# Patient Record
Sex: FEMALE | Race: WHITE | NOT HISPANIC OR LATINO | Employment: UNEMPLOYED | URBAN - METROPOLITAN AREA
[De-identification: names, ages, dates, MRNs, and addresses within clinical notes are randomized per-mention and may not be internally consistent; named-entity substitution may affect disease eponyms.]

---

## 2017-02-06 ENCOUNTER — HOSPITAL ENCOUNTER (OUTPATIENT)
Dept: RADIOLOGY | Facility: MEDICAL CENTER | Age: 57
DRG: 460 | End: 2017-02-06
Attending: NEUROLOGICAL SURGERY | Admitting: NEUROLOGICAL SURGERY
Payer: COMMERCIAL

## 2017-02-06 DIAGNOSIS — Z01.812 PRE-OPERATIVE LABORATORY EXAMINATION: ICD-10-CM

## 2017-02-06 DIAGNOSIS — Z01.811 PRE-OPERATIVE RESPIRATORY EXAMINATION: ICD-10-CM

## 2017-02-06 DIAGNOSIS — Z01.810 PRE-OPERATIVE CARDIOVASCULAR EXAMINATION: ICD-10-CM

## 2017-02-06 LAB
ANION GAP SERPL CALC-SCNC: 7 MMOL/L (ref 0–11.9)
APPEARANCE UR: CLEAR
APTT PPP: 29.4 SEC (ref 24.7–36)
BASOPHILS # BLD AUTO: 0.7 % (ref 0–1.8)
BASOPHILS # BLD: 0.03 K/UL (ref 0–0.12)
BILIRUB UR QL STRIP.AUTO: NEGATIVE
BUN SERPL-MCNC: 14 MG/DL (ref 8–22)
CALCIUM SERPL-MCNC: 9.3 MG/DL (ref 8.5–10.5)
CHLORIDE SERPL-SCNC: 105 MMOL/L (ref 96–112)
CO2 SERPL-SCNC: 30 MMOL/L (ref 20–33)
COLOR UR: COLORLESS
CREAT SERPL-MCNC: 0.86 MG/DL (ref 0.5–1.4)
CULTURE IF INDICATED INDCX: NO UA CULTURE
EKG IMPRESSION: NORMAL
EOSINOPHIL # BLD AUTO: 0.06 K/UL (ref 0–0.51)
EOSINOPHIL NFR BLD: 1.4 % (ref 0–6.9)
ERYTHROCYTE [DISTWIDTH] IN BLOOD BY AUTOMATED COUNT: 45 FL (ref 35.9–50)
GFR SERPL CREATININE-BSD FRML MDRD: >60 ML/MIN/1.73 M 2
GLUCOSE SERPL-MCNC: 73 MG/DL (ref 65–99)
GLUCOSE UR STRIP.AUTO-MCNC: NEGATIVE MG/DL
HCT VFR BLD AUTO: 40 % (ref 37–47)
HGB BLD-MCNC: 13.4 G/DL (ref 12–16)
IMM GRANULOCYTES # BLD AUTO: 0.01 K/UL (ref 0–0.11)
IMM GRANULOCYTES NFR BLD AUTO: 0.2 % (ref 0–0.9)
INR PPP: 0.93 (ref 0.87–1.13)
KETONES UR STRIP.AUTO-MCNC: NEGATIVE MG/DL
LEUKOCYTE ESTERASE UR QL STRIP.AUTO: NEGATIVE
LYMPHOCYTES # BLD AUTO: 1.63 K/UL (ref 1–4.8)
LYMPHOCYTES NFR BLD: 38.3 % (ref 22–41)
MCH RBC QN AUTO: 31.8 PG (ref 27–33)
MCHC RBC AUTO-ENTMCNC: 33.5 G/DL (ref 33.6–35)
MCV RBC AUTO: 94.8 FL (ref 81.4–97.8)
MICRO URNS: NORMAL
MONOCYTES # BLD AUTO: 0.25 K/UL (ref 0–0.85)
MONOCYTES NFR BLD AUTO: 5.9 % (ref 0–13.4)
NEUTROPHILS # BLD AUTO: 2.28 K/UL (ref 2–7.15)
NEUTROPHILS NFR BLD: 53.5 % (ref 44–72)
NITRITE UR QL STRIP.AUTO: NEGATIVE
NRBC # BLD AUTO: 0 K/UL
NRBC BLD AUTO-RTO: 0 /100 WBC
PH UR STRIP.AUTO: 7 [PH]
PLATELET # BLD AUTO: 169 K/UL (ref 164–446)
PMV BLD AUTO: 9.7 FL (ref 9–12.9)
POTASSIUM SERPL-SCNC: 3.8 MMOL/L (ref 3.6–5.5)
PROT UR QL STRIP: NEGATIVE MG/DL
PROTHROMBIN TIME: 12.8 SEC (ref 12–14.6)
RBC # BLD AUTO: 4.22 M/UL (ref 4.2–5.4)
RBC UR QL AUTO: NEGATIVE
SODIUM SERPL-SCNC: 142 MMOL/L (ref 135–145)
SP GR UR STRIP.AUTO: 1.01
WBC # BLD AUTO: 4.3 K/UL (ref 4.8–10.8)

## 2017-02-06 PROCEDURE — 36415 COLL VENOUS BLD VENIPUNCTURE: CPT

## 2017-02-06 PROCEDURE — 85025 COMPLETE CBC W/AUTO DIFF WBC: CPT

## 2017-02-06 PROCEDURE — 85610 PROTHROMBIN TIME: CPT

## 2017-02-06 PROCEDURE — 71010 DX-CHEST-LIMITED (1 VIEW): CPT

## 2017-02-06 PROCEDURE — 85730 THROMBOPLASTIN TIME PARTIAL: CPT

## 2017-02-06 PROCEDURE — 81003 URINALYSIS AUTO W/O SCOPE: CPT

## 2017-02-06 PROCEDURE — 80048 BASIC METABOLIC PNL TOTAL CA: CPT

## 2017-02-06 RX ORDER — HYDROCODONE BITARTRATE AND ACETAMINOPHEN 10; 325 MG/1; MG/1
1-2 TABLET ORAL EVERY 6 HOURS PRN
Status: ON HOLD | COMMUNITY
End: 2017-02-22

## 2017-02-20 ENCOUNTER — APPOINTMENT (OUTPATIENT)
Dept: RADIOLOGY | Facility: MEDICAL CENTER | Age: 57
DRG: 460 | End: 2017-02-20
Attending: NEUROLOGICAL SURGERY
Payer: COMMERCIAL

## 2017-02-20 ENCOUNTER — HOSPITAL ENCOUNTER (INPATIENT)
Facility: MEDICAL CENTER | Age: 57
LOS: 2 days | DRG: 460 | End: 2017-02-22
Attending: NEUROLOGICAL SURGERY | Admitting: NEUROLOGICAL SURGERY
Payer: COMMERCIAL

## 2017-02-20 PROBLEM — M54.16 LUMBAR RADICULOPATHY: Status: ACTIVE | Noted: 2017-02-20

## 2017-02-20 PROCEDURE — 160048 HCHG OR STATISTICAL LEVEL 1-5: Performed by: NEUROLOGICAL SURGERY

## 2017-02-20 PROCEDURE — 500367 HCHG DRAIN KIT, HEMOVAC: Performed by: NEUROLOGICAL SURGERY

## 2017-02-20 PROCEDURE — 160002 HCHG RECOVERY MINUTES (STAT): Performed by: NEUROLOGICAL SURGERY

## 2017-02-20 PROCEDURE — C1763 CONN TISS, NON-HUMAN: HCPCS | Performed by: NEUROLOGICAL SURGERY

## 2017-02-20 PROCEDURE — A9270 NON-COVERED ITEM OR SERVICE: HCPCS | Performed by: NURSE PRACTITIONER

## 2017-02-20 PROCEDURE — 110382 HCHG SHELL REV 271: Performed by: NEUROLOGICAL SURGERY

## 2017-02-20 PROCEDURE — 700102 HCHG RX REV CODE 250 W/ 637 OVERRIDE(OP)

## 2017-02-20 PROCEDURE — 0QP004Z REMOVAL OF INTERNAL FIXATION DEVICE FROM LUMBAR VERTEBRA, OPEN APPROACH: ICD-10-PCS | Performed by: NEUROLOGICAL SURGERY

## 2017-02-20 PROCEDURE — 501838 HCHG SUTURE GENERAL: Performed by: NEUROLOGICAL SURGERY

## 2017-02-20 PROCEDURE — 160041 HCHG SURGERY MINUTES - EA ADDL 1 MIN LEVEL 4: Performed by: NEUROLOGICAL SURGERY

## 2017-02-20 PROCEDURE — 160009 HCHG ANES TIME/MIN: Performed by: NEUROLOGICAL SURGERY

## 2017-02-20 PROCEDURE — A9270 NON-COVERED ITEM OR SERVICE: HCPCS

## 2017-02-20 PROCEDURE — 700112 HCHG RX REV CODE 229: Performed by: NURSE PRACTITIONER

## 2017-02-20 PROCEDURE — 160035 HCHG PACU - 1ST 60 MINS PHASE I: Performed by: NEUROLOGICAL SURGERY

## 2017-02-20 PROCEDURE — 160029 HCHG SURGERY MINUTES - 1ST 30 MINS LEVEL 4: Performed by: NEUROLOGICAL SURGERY

## 2017-02-20 PROCEDURE — 700101 HCHG RX REV CODE 250

## 2017-02-20 PROCEDURE — 700111 HCHG RX REV CODE 636 W/ 250 OVERRIDE (IP)

## 2017-02-20 PROCEDURE — 110371 HCHG SHELL REV 272: Performed by: NEUROLOGICAL SURGERY

## 2017-02-20 PROCEDURE — A4606 OXYGEN PROBE USED W OXIMETER: HCPCS | Performed by: NEUROLOGICAL SURGERY

## 2017-02-20 PROCEDURE — 0SG30K1 FUSION OF LUMBOSACRAL JOINT WITH NONAUTOLOGOUS TISSUE SUBSTITUTE, POSTERIOR APPROACH, POSTERIOR COLUMN, OPEN APPROACH: ICD-10-PCS | Performed by: NEUROLOGICAL SURGERY

## 2017-02-20 PROCEDURE — 700102 HCHG RX REV CODE 250 W/ 637 OVERRIDE(OP): Performed by: NURSE PRACTITIONER

## 2017-02-20 PROCEDURE — 700101 HCHG RX REV CODE 250: Performed by: NURSE PRACTITIONER

## 2017-02-20 PROCEDURE — 770006 HCHG ROOM/CARE - MED/SURG/GYN SEMI*

## 2017-02-20 PROCEDURE — 502626 HCHG SURGIFLO HEMOSTATIC MATRIX 6ML: Performed by: NEUROLOGICAL SURGERY

## 2017-02-20 PROCEDURE — 502240 HCHG MISC OR SUPPLY RC 0272: Performed by: NEUROLOGICAL SURGERY

## 2017-02-20 PROCEDURE — 0SG00K1 FUSION OF LUMBAR VERTEBRAL JOINT WITH NONAUTOLOGOUS TISSUE SUBSTITUTE, POSTERIOR APPROACH, POSTERIOR COLUMN, OPEN APPROACH: ICD-10-PCS | Performed by: NEUROLOGICAL SURGERY

## 2017-02-20 PROCEDURE — 01NB0ZZ RELEASE LUMBAR NERVE, OPEN APPROACH: ICD-10-PCS | Performed by: NEUROLOGICAL SURGERY

## 2017-02-20 PROCEDURE — 160036 HCHG PACU - EA ADDL 30 MINS PHASE I: Performed by: NEUROLOGICAL SURGERY

## 2017-02-20 PROCEDURE — A4314 CATH W/DRAINAGE 2-WAY LATEX: HCPCS | Performed by: NEUROLOGICAL SURGERY

## 2017-02-20 DEVICE — MAGNIFUSE 1X5CM: Type: IMPLANTABLE DEVICE | Status: FUNCTIONAL

## 2017-02-20 RX ORDER — BUPIVACAINE HYDROCHLORIDE AND EPINEPHRINE 5; 5 MG/ML; UG/ML
INJECTION, SOLUTION EPIDURAL; INTRACAUDAL; PERINEURAL
Status: DISCONTINUED | OUTPATIENT
Start: 2017-02-20 | End: 2017-02-20 | Stop reason: HOSPADM

## 2017-02-20 RX ORDER — MAGNESIUM HYDROXIDE 1200 MG/15ML
LIQUID ORAL
Status: DISCONTINUED | OUTPATIENT
Start: 2017-02-20 | End: 2017-02-20 | Stop reason: HOSPADM

## 2017-02-20 RX ORDER — ENEMA 19; 7 G/133ML; G/133ML
1 ENEMA RECTAL
Status: DISCONTINUED | OUTPATIENT
Start: 2017-02-20 | End: 2017-02-22 | Stop reason: HOSPADM

## 2017-02-20 RX ORDER — AMOXICILLIN 250 MG
1 CAPSULE ORAL
Status: DISCONTINUED | OUTPATIENT
Start: 2017-02-20 | End: 2017-02-22 | Stop reason: HOSPADM

## 2017-02-20 RX ORDER — ONDANSETRON 4 MG/1
4 TABLET, ORALLY DISINTEGRATING ORAL EVERY 4 HOURS PRN
Status: DISCONTINUED | OUTPATIENT
Start: 2017-02-20 | End: 2017-02-22 | Stop reason: HOSPADM

## 2017-02-20 RX ORDER — HYDROCHLOROTHIAZIDE 25 MG/1
25 TABLET ORAL 2 TIMES DAILY PRN
Status: DISCONTINUED | OUTPATIENT
Start: 2017-02-20 | End: 2017-02-22 | Stop reason: HOSPADM

## 2017-02-20 RX ORDER — DOCUSATE SODIUM 100 MG/1
100 CAPSULE, LIQUID FILLED ORAL 2 TIMES DAILY
Status: DISCONTINUED | OUTPATIENT
Start: 2017-02-20 | End: 2017-02-22 | Stop reason: HOSPADM

## 2017-02-20 RX ORDER — METHOCARBAMOL 750 MG/1
750 TABLET, FILM COATED ORAL EVERY 8 HOURS PRN
Status: DISCONTINUED | OUTPATIENT
Start: 2017-02-20 | End: 2017-02-22 | Stop reason: HOSPADM

## 2017-02-20 RX ORDER — MIDAZOLAM HYDROCHLORIDE 1 MG/ML
INJECTION INTRAMUSCULAR; INTRAVENOUS
Status: DISPENSED
Start: 2017-02-20 | End: 2017-02-21

## 2017-02-20 RX ORDER — GABAPENTIN 300 MG/1
600 CAPSULE ORAL 3 TIMES DAILY
Status: DISCONTINUED | OUTPATIENT
Start: 2017-02-20 | End: 2017-02-22 | Stop reason: HOSPADM

## 2017-02-20 RX ORDER — DIPHENHYDRAMINE HYDROCHLORIDE 50 MG/ML
25 INJECTION INTRAMUSCULAR; INTRAVENOUS EVERY 6 HOURS PRN
Status: DISCONTINUED | OUTPATIENT
Start: 2017-02-20 | End: 2017-02-22 | Stop reason: HOSPADM

## 2017-02-20 RX ORDER — PROMETHAZINE HYDROCHLORIDE 25 MG/1
12.5-25 SUPPOSITORY RECTAL EVERY 4 HOURS PRN
Status: DISCONTINUED | OUTPATIENT
Start: 2017-02-20 | End: 2017-02-22 | Stop reason: HOSPADM

## 2017-02-20 RX ORDER — CELECOXIB 200 MG/1
CAPSULE ORAL
Status: COMPLETED
Start: 2017-02-20 | End: 2017-02-20

## 2017-02-20 RX ORDER — DIPHENHYDRAMINE HYDROCHLORIDE 50 MG/ML
INJECTION INTRAMUSCULAR; INTRAVENOUS
Status: COMPLETED
Start: 2017-02-20 | End: 2017-02-20

## 2017-02-20 RX ORDER — OXYCODONE HCL 20 MG/1
TABLET, FILM COATED, EXTENDED RELEASE ORAL
Status: COMPLETED
Start: 2017-02-20 | End: 2017-02-20

## 2017-02-20 RX ORDER — ACETAMINOPHEN 500 MG
TABLET ORAL
Status: COMPLETED
Start: 2017-02-20 | End: 2017-02-20

## 2017-02-20 RX ORDER — SODIUM CHLORIDE AND POTASSIUM CHLORIDE 150; 900 MG/100ML; MG/100ML
INJECTION, SOLUTION INTRAVENOUS CONTINUOUS
Status: DISCONTINUED | OUTPATIENT
Start: 2017-02-20 | End: 2017-02-22 | Stop reason: HOSPADM

## 2017-02-20 RX ORDER — BISACODYL 10 MG
10 SUPPOSITORY, RECTAL RECTAL
Status: DISCONTINUED | OUTPATIENT
Start: 2017-02-20 | End: 2017-02-22 | Stop reason: HOSPADM

## 2017-02-20 RX ORDER — METOPROLOL SUCCINATE 25 MG/1
50 TABLET, EXTENDED RELEASE ORAL DAILY
Status: DISCONTINUED | OUTPATIENT
Start: 2017-02-21 | End: 2017-02-22 | Stop reason: HOSPADM

## 2017-02-20 RX ORDER — DIPHENHYDRAMINE HCL 25 MG
25 TABLET ORAL EVERY 6 HOURS PRN
Status: DISCONTINUED | OUTPATIENT
Start: 2017-02-20 | End: 2017-02-22 | Stop reason: HOSPADM

## 2017-02-20 RX ORDER — SODIUM CHLORIDE, SODIUM LACTATE, POTASSIUM CHLORIDE, CALCIUM CHLORIDE 600; 310; 30; 20 MG/100ML; MG/100ML; MG/100ML; MG/100ML
1000 INJECTION, SOLUTION INTRAVENOUS
Status: COMPLETED | OUTPATIENT
Start: 2017-02-20 | End: 2017-02-20

## 2017-02-20 RX ORDER — CEFAZOLIN SODIUM 2 G/100ML
2 INJECTION, SOLUTION INTRAVENOUS EVERY 8 HOURS
Status: COMPLETED | OUTPATIENT
Start: 2017-02-20 | End: 2017-02-21

## 2017-02-20 RX ORDER — ONDANSETRON 2 MG/ML
4 INJECTION INTRAMUSCULAR; INTRAVENOUS EVERY 4 HOURS PRN
Status: DISCONTINUED | OUTPATIENT
Start: 2017-02-20 | End: 2017-02-22 | Stop reason: HOSPADM

## 2017-02-20 RX ORDER — POLYETHYLENE GLYCOL 3350 17 G/17G
1 POWDER, FOR SOLUTION ORAL 2 TIMES DAILY PRN
Status: DISCONTINUED | OUTPATIENT
Start: 2017-02-20 | End: 2017-02-22 | Stop reason: HOSPADM

## 2017-02-20 RX ORDER — LIDOCAINE HYDROCHLORIDE 10 MG/ML
INJECTION, SOLUTION INFILTRATION; PERINEURAL
Status: COMPLETED
Start: 2017-02-20 | End: 2017-02-20

## 2017-02-20 RX ORDER — BACITRACIN 50000 [IU]/1
INJECTION, POWDER, FOR SOLUTION INTRAMUSCULAR
Status: DISCONTINUED | OUTPATIENT
Start: 2017-02-20 | End: 2017-02-20 | Stop reason: HOSPADM

## 2017-02-20 RX ORDER — PROMETHAZINE HYDROCHLORIDE 25 MG/1
12.5-25 TABLET ORAL EVERY 4 HOURS PRN
Status: DISCONTINUED | OUTPATIENT
Start: 2017-02-20 | End: 2017-02-22 | Stop reason: HOSPADM

## 2017-02-20 RX ORDER — ONDANSETRON 2 MG/ML
INJECTION INTRAMUSCULAR; INTRAVENOUS
Status: COMPLETED
Start: 2017-02-20 | End: 2017-02-20

## 2017-02-20 RX ORDER — GABAPENTIN 300 MG/1
CAPSULE ORAL
Status: COMPLETED
Start: 2017-02-20 | End: 2017-02-20

## 2017-02-20 RX ADMIN — FENTANYL CITRATE 50 MCG: 50 INJECTION, SOLUTION INTRAMUSCULAR; INTRAVENOUS at 21:25

## 2017-02-20 RX ADMIN — CELECOXIB 400 MG: 200 CAPSULE ORAL at 14:45

## 2017-02-20 RX ADMIN — ONDANSETRON 4 MG: 2 INJECTION, SOLUTION INTRAMUSCULAR; INTRAVENOUS at 20:47

## 2017-02-20 RX ADMIN — ACETAMINOPHEN 1000 MG: 500 TABLET, FILM COATED ORAL at 14:45

## 2017-02-20 RX ADMIN — HYDROMORPHONE HYDROCHLORIDE: 2 INJECTION INTRAMUSCULAR; INTRAVENOUS; SUBCUTANEOUS at 22:15

## 2017-02-20 RX ADMIN — HYDROMORPHONE HYDROCHLORIDE 0.5 MG: 1 INJECTION, SOLUTION INTRAMUSCULAR; INTRAVENOUS; SUBCUTANEOUS at 20:55

## 2017-02-20 RX ADMIN — FENTANYL CITRATE 50 MCG: 50 INJECTION, SOLUTION INTRAMUSCULAR; INTRAVENOUS at 21:00

## 2017-02-20 RX ADMIN — DIPHENHYDRAMINE HYDROCHLORIDE 12.5 MG: 50 INJECTION INTRAMUSCULAR; INTRAVENOUS at 21:05

## 2017-02-20 RX ADMIN — ONDANSETRON 4 MG: 2 INJECTION, SOLUTION INTRAMUSCULAR; INTRAVENOUS at 21:17

## 2017-02-20 RX ADMIN — DIPHENHYDRAMINE HYDROCHLORIDE 12.5 MG: 50 INJECTION INTRAMUSCULAR; INTRAVENOUS at 21:00

## 2017-02-20 RX ADMIN — SODIUM CHLORIDE, SODIUM LACTATE, POTASSIUM CHLORIDE, CALCIUM CHLORIDE 1000 ML: 600; 310; 30; 20 INJECTION, SOLUTION INTRAVENOUS at 13:00

## 2017-02-20 RX ADMIN — LIDOCAINE HYDROCHLORIDE 0.3 ML: 10 INJECTION, SOLUTION INFILTRATION; PERINEURAL at 13:00

## 2017-02-20 RX ADMIN — POTASSIUM CHLORIDE AND SODIUM CHLORIDE: 900; 150 INJECTION, SOLUTION INTRAVENOUS at 22:39

## 2017-02-20 RX ADMIN — METHOCARBAMOL 750 MG: 750 TABLET ORAL at 23:03

## 2017-02-20 RX ADMIN — DOCUSATE SODIUM 100 MG: 100 CAPSULE ORAL at 22:51

## 2017-02-20 RX ADMIN — HYDROMORPHONE HYDROCHLORIDE 0.5 MG: 1 INJECTION, SOLUTION INTRAMUSCULAR; INTRAVENOUS; SUBCUTANEOUS at 20:50

## 2017-02-20 RX ADMIN — GABAPENTIN 600 MG: 300 CAPSULE ORAL at 14:45

## 2017-02-20 RX ADMIN — OXYCODONE HYDROCHLORIDE 20 MG: 20 TABLET, FILM COATED, EXTENDED RELEASE ORAL at 14:45

## 2017-02-20 RX ADMIN — GABAPENTIN 600 MG: 300 CAPSULE ORAL at 22:51

## 2017-02-20 ASSESSMENT — PAIN SCALES - GENERAL
PAINLEVEL_OUTOF10: 7
PAINLEVEL_OUTOF10: 4
PAINLEVEL_OUTOF10: 8
PAINLEVEL_OUTOF10: 5
PAINLEVEL_OUTOF10: 7
PAINLEVEL_OUTOF10: 7

## 2017-02-20 NOTE — IP AVS SNAPSHOT
" <p align=\"LEFT\"><IMG SRC=\"//EMRWB/blob$/[email or web address]\" alt=\"Image\" WIDTH=\"50%\" HEIGHT=\"200\" BORDER=\"\"></p>                   Name:Anabella Neves  Medical Record Number:[id number]  CSN: [id number]    YOB: 1960   Age: 56 y.o.  Sex: female  HT:1.676 m (5' 5.98\") WT: 80.5 kg (177 lb 7.5 oz)          Admit Date: 2/20/2017     Discharge Date:   Today's Date: 2/22/2017  Attending Doctor:  Shawn Carter M.D.                  Allergies:  Review of patient's allergies indicates no known allergies.          Follow-up Information     1. Follow up with Shawn Carter M.D. In 2 weeks.    Specialty:  Neurosurgery    Contact information    [address] [address]  [phone]           Medication List      Take these Medications        Instructions    gabapentin 600 MG tablet   Commonly known as:  NEURONTIN    Take 600 mg by mouth 3 times a day.   Dose:  600 mg       hydrochlorothiazide 25 MG Tabs   Commonly known as:  HYDRODIURIL    Take 25 mg by mouth 2 times a day as needed.   Dose:  25 mg       methocarbamol 750 MG Tabs   Commonly known as:  ROBAXIN    Take 1 Tab by mouth every 8 hours as needed (Use caution when administering with other sedatives (narcotics, muscle relaxants, anxiolytics, and hypnotics)).   Dose:  750 mg       metoprolol SR 50 MG Tb24   Commonly known as:  TOPROL XL    Take 50 mg by mouth every day.   Dose:  50 mg       oxycodone-acetaminophen  MG Tabs   Commonly known as:  PERCOCET-10    Take 1-2 Tabs by mouth every four hours as needed for Moderate Pain.   Dose:  1-2 Tab         "

## 2017-02-20 NOTE — IP AVS SNAPSHOT
2/22/2017          Anabella Trish Edinson  [address]  [facility] [address]    Dear Anabella:    [facility] wants to ensure your discharge home is safe and you or your loved ones have had all your questions answered regarding your care after you leave the hospital.    You may receive a telephone call within two days of your discharge.  This call is to make certain you understand your discharge instructions as well as ensure we provided you with the best care possible during your stay with us.     The call will only last approximately 3-5 minutes and will be done by a nurse.    Once again, we want to ensure your discharge home is safe and that you have a clear understanding of any next steps in your care.  If you have any questions or concerns, please do not hesitate to contact us, we are here for you.  Thank you for choosing [facility] for your healthcare needs.    Sincerely,    Anant Taylor    [facility]

## 2017-02-20 NOTE — IP AVS SNAPSHOT
Travergence Access Code: [email or web address]  Expires: 2/27/2017  9:44 AM    Your email address is not on file at [facility] [employer].  Email Addresses are required for you to sign up for Travergence, please contact [phone] to verify your personal information and to provide your email address prior to attempting to register for Travergence.    Anabella Neves  [address]  [place] [address]    Travergence  A secure, online tool to manage your health information     [employer]’s Travergence® is a secure, online tool that connects you to your personalized health information from the privacy of your home -- day or night - making it very easy for you to manage your healthcare. Once the activation process is completed, you can even access your medical information using the Travergence corbin, which is available for free in the Apple Corbin store or Google Play store.     To learn more about Travergence, visit [email or web address]    There are two levels of access available (as shown below):   My Chart Features  [facility] Primary Care Doctor [facility]  Specialists [facility]  Urgent  Care Non-[facility] Primary Care Doctor   Email your healthcare team securely and privately 24/7 X X X    Manage appointments: schedule your next appointment; view details of past/upcoming appointments X      Request prescription refills. X      View recent personal medical records, including lab and immunizations X X X X   View health record, including health history, allergies, medications X X X X   Read reports about your outpatient visits, procedures, consult and ER notes X X X X   See your discharge summary, which is a recap of your hospital and/or ER visit that includes your diagnosis, lab results, and care plan X X  X     How to register for Travergence:  Once your e-mail address has been verified, follow the following steps to sign up for Travergence.     1. Go to  [email or web address]  2. Click on the Sign Up Now box, which takes you to the New Member Sign Up page.  You will need to provide the following information:  a. Enter your [employer] Access Code exactly as it appears at the top of this page. (You will not need to use this code after you’ve completed the sign-up process. If you do not sign up before the expiration date, you must request a new code.)   b. Enter your date of birth.   c. Enter your home email address.   d. Click Submit, and follow the next screen’s instructions.  3. Create a [employer] ID. This will be your Dynamics login ID and cannot be changed, so think of one that is secure and easy to remember.  4. Create a Dynamics password. You can change your password at any time.  5. Enter your Password Reset Question and Answer. This can be used at a later time if you forget your password.   6. Enter your e-mail address. This allows you to receive e-mail notifications when new information is available in Dynamics.  7. Click Sign Up. You can now view your health information.    For assistance activating your Dynamics account, call [phone]

## 2017-02-20 NOTE — PROGRESS NOTES
Pt states that she is in pain in pre op- pt states that she last took her Gabapentin and [place] at 0900 not 1000 that she previously told the pharmacy tech earlier today-RN will call anesthesia

## 2017-02-20 NOTE — IP AVS SNAPSHOT
" Home Care Instructions                                                                                                                  Name:Anabella Neves  Medical Record Number:[id number]  CSN: [id number]    YOB: 1960   Age: 56 y.o.  Sex: female  HT:1.676 m (5' 5.98\") WT: 80.5 kg (177 lb 7.5 oz)          Admit Date: 2/20/2017     Discharge Date:   Today's Date: 2/22/2017  Attending Doctor:  Shawn Carter M.D.                  Allergies:  Review of patient's allergies indicates no known allergies.            Discharge Instructions       Discharge Instructions    Discharged to home by car with relative. Discharged via wheelchair, hospital escort: Yes.  Special equipment needed: Not Applicable    Be sure to schedule a follow-up appointment with your primary care doctor or any specialists as instructed.     Discharge Plan:   Diet Plan: Discussed  Activity Level: Discussed  Confirmed Follow up Appointment: Patient to Call and Schedule Appointment  Confirmed Symptoms Management: Discussed  Medication Reconciliation Updated: Yes  Influenza Vaccine Indication: Indicated: Adults > or equal to 18 years of age with severe allergy to eggs (Flublok vaccine)  Influenza Vaccine Given - only chart on this line when given: Influenza Vaccine Given (See MAR)    I understand that a diet low in cholesterol, fat, and sodium is recommended for good health. Unless I have been given specific instructions below for another diet, I accept this instruction as my diet prescription.   Other diet: none    Special Instructions:   Ambulate as much as comfortable  Ok to shower 2/23, pat incision dry, leave open to air- no dressing   No Aspirin or non-steroidal anti-inflammatory medications (aleve, motrin, ibuprofen, celebrex)  No driving for 2 weeks/ No driving while on narcotic medication  Over the counter stool softeners daily while on narcotics  No lifting greater than 15 pounds, no repetitive bending or twisting  Follow up at " [facility] 2 weeks after surgery    · Is patient discharged on Warfarin / Coumadin?   No     · Is patient Post Blood Transfusion?  No    Depression / Suicide Risk    As you are discharged from this [facility] facility, it is important to learn how to keep safe from harming yourself.    Recognize the warning signs:  · Abrupt changes in personality, positive or negative- including increase in energy   · Giving away possessions  · Change in eating patterns- significant weight changes-  positive or negative  · Change in sleeping patterns- unable to sleep or sleeping all the time   · Unwillingness or inability to communicate  · Depression  · Unusual sadness, discouragement and loneliness  · Talk of wanting to die  · Neglect of personal appearance   · Rebelliousness- reckless behavior  · Withdrawal from people/activities they love  · Confusion- inability to concentrate     If you or a loved one observes any of these behaviors or has concerns about self-harm, here's what you can do:  · Talk about it- your feelings and reasons for harming yourself  · Remove any means that you might use to hurt yourself (examples: pills, rope, extension cords, firearm)  · Get professional help from the community (Mental Health, Substance Abuse, psychological counseling)  · Do not be alone:Call your Safe Contact- someone whom you trust who will be there for you.  · Call your local CRISIS HOTLINE 525-6668 or 316-402-0437  · Call your local Children's Mobile Crisis Response Team [place] (372) 220-4632 or www.[employer]  · Call the toll free National Suicide Prevention Hotlines   · National Suicide Prevention Lifeline 586-536-XSCV (5038)  · National Hope Line Network 800-SUICIDE (233-8560)        Follow-up Information     1. Follow up with Shawn Carter M.D. In 2 weeks.    Specialty:  Neurosurgery    Contact information    [address]  [place] [address] [phone]           Discharge Medication  Instructions:    Below are the medications your physician expects you to take upon discharge:    Review all your home medications and newly ordered medications with your doctor and/or pharmacist. Follow medication instructions as directed by your doctor and/or pharmacist.    Please keep your medication list with you and share with your physician.               Medication List      START taking these medications        Instructions    oxycodone-acetaminophen  MG Tabs   Last time this was given:  2 Tabs on 2/22/2017 11:07 AM   Commonly known as:  PERCOCET-10    Take 1-2 Tabs by mouth every four hours as needed for Moderate Pain.   Dose:  1-2 Tab         CONTINUE taking these medications        Instructions    gabapentin 600 MG tablet   Commonly known as:  NEURONTIN    Take 600 mg by mouth 3 times a day.   Dose:  600 mg       hydrochlorothiazide 25 MG Tabs   Commonly known as:  HYDRODIURIL    Take 25 mg by mouth 2 times a day as needed.   Dose:  25 mg       methocarbamol 750 MG Tabs   Last time this was given:  750 mg on 2/20/2017 11:03 PM   Commonly known as:  ROBAXIN    Take 1 Tab by mouth every 8 hours as needed (Use caution when administering with other sedatives (narcotics, muscle relaxants, anxiolytics, and hypnotics)).   Dose:  750 mg       metoprolol SR 50 MG Tb24   Commonly known as:  TOPROL XL    Take 50 mg by mouth every day.   Dose:  50 mg         STOP taking these medications     hydrocodone/acetaminophen  MG Tabs   Commonly known as:  NORCO               Instructions           Diet / Nutrition:    Follow any diet instructions given to you by your doctor or the dietician, including how much salt (sodium) you are allowed each day.    If you are overweight, talk to your doctor about a weight reduction plan.    Activity:    Remain physically active following your doctor's instructions about exercise and activity.    Rest often.     Any time you become even a little tired or short of breath, SIT  DOWN and rest.    Worsening Symptoms:    Report any of the following signs and symptoms to the doctor's office immediately:    *Pain of jaw, arm, or neck  *Chest pain not relieved by medication                               *Dizziness or loss of consciousness  *Difficulty breathing even when at rest   *More tired than usual                                       *Bleeding drainage or swelling of surgical site  *Swelling of feet, ankles, legs or stomach                 *Fever (>100ºF)  *Pink or blood tinged sputum  *Weight gain (3lbs/day or 5lbs /week)           *Shock from internal defibrillator (if applicable)  *Palpitations or irregular heartbeats                *Cool and/or numb extremities    Stroke Awareness    Common Risk Factors for Stroke include:    Age  Atrial Fibrillation  Carotid Artery Stenosis  Diabetes Mellitus  Excessive alcohol consumption  High blood pressure  Overweight   Physical inactivity  Smoking    Warning signs and symptoms of a stroke include:    *Sudden numbness or weakness of the face, arm or leg (especially on one side of the body).  *Sudden confusion, trouble speaking or understanding.  *Sudden trouble seeing in one or both eyes.  *Sudden trouble walking, dizziness, loss of balance or coordination.Sudden severe headache with no known cause.    It is very important to get treatment quickly when a stroke occurs. If you experience any of the above warning signs, call 911 immediately.                   Disclaimer         Quit Smoking / Tobacco Use:    I understand the use of any tobacco products increases my chance of suffering from future heart disease or stroke and could cause other illnesses which may shorten my life. Quitting the use of tobacco products is the single most important thing I can do to improve my health. For further information on smoking / tobacco cessation call a Toll Free Quit Line at 1-596.856.7279 (*National Cancer [place]) or 1-519.673.8290 (American Lung  Association) or you can access the web based program at www.lungusa.org.    [place] Tobacco Users Help Line:  (705) 987-2344       Toll Free: 1-622.926.5036  Quit Tobacco Program [facility] (785)268-6693    Crisis Hotline:    [place] Crisis Hotline:  8-077-FAKTJHK or 1-502.854.8509    [place] Crisis Hotline:    1-350.369.4973 or 689-592-4306    Discharge Survey:   Thank you for choosing [facility]. We hope we did everything we could to make your hospital stay a pleasant one. You may be receiving a phone survey and we would appreciate your time and participation in answering the questions. Your input is very valuable to us in our efforts to improve our service to our patients and their families.        My signature on this form indicates that:    1. I have reviewed and understand the above information.  2. My questions regarding this information have been answered to my satisfaction.  3. I have formulated a plan with my discharge nurse to obtain my prescribed medications for home.                  Disclaimer         __________________________________                     __________       ________                       Patient Signature                                                 Date                    Time

## 2017-02-21 PROCEDURE — 97165 OT EVAL LOW COMPLEX 30 MIN: CPT

## 2017-02-21 PROCEDURE — 97161 PT EVAL LOW COMPLEX 20 MIN: CPT

## 2017-02-21 PROCEDURE — 700102 HCHG RX REV CODE 250 W/ 637 OVERRIDE(OP): Performed by: NURSE PRACTITIONER

## 2017-02-21 PROCEDURE — G8988 SELF CARE GOAL STATUS: HCPCS | Mod: CI

## 2017-02-21 PROCEDURE — G8980 MOBILITY D/C STATUS: HCPCS | Mod: CI

## 2017-02-21 PROCEDURE — 90471 IMMUNIZATION ADMIN: CPT

## 2017-02-21 PROCEDURE — G8978 MOBILITY CURRENT STATUS: HCPCS | Mod: CI

## 2017-02-21 PROCEDURE — G8989 SELF CARE D/C STATUS: HCPCS | Mod: CI

## 2017-02-21 PROCEDURE — A9270 NON-COVERED ITEM OR SERVICE: HCPCS | Performed by: NURSE PRACTITIONER

## 2017-02-21 PROCEDURE — 700102 HCHG RX REV CODE 250 W/ 637 OVERRIDE(OP): Performed by: NEUROLOGICAL SURGERY

## 2017-02-21 PROCEDURE — 90686 IIV4 VACC NO PRSV 0.5 ML IM: CPT | Performed by: NEUROLOGICAL SURGERY

## 2017-02-21 PROCEDURE — G8987 SELF CARE CURRENT STATUS: HCPCS | Mod: CI

## 2017-02-21 PROCEDURE — G8979 MOBILITY GOAL STATUS: HCPCS | Mod: CI

## 2017-02-21 PROCEDURE — 700112 HCHG RX REV CODE 229: Performed by: NURSE PRACTITIONER

## 2017-02-21 PROCEDURE — 700111 HCHG RX REV CODE 636 W/ 250 OVERRIDE (IP): Performed by: NURSE PRACTITIONER

## 2017-02-21 PROCEDURE — 700111 HCHG RX REV CODE 636 W/ 250 OVERRIDE (IP): Performed by: NEUROLOGICAL SURGERY

## 2017-02-21 PROCEDURE — 3E0234Z INTRODUCTION OF SERUM, TOXOID AND VACCINE INTO MUSCLE, PERCUTANEOUS APPROACH: ICD-10-PCS | Performed by: NEUROLOGICAL SURGERY

## 2017-02-21 PROCEDURE — A9270 NON-COVERED ITEM OR SERVICE: HCPCS | Performed by: NEUROLOGICAL SURGERY

## 2017-02-21 PROCEDURE — 770001 HCHG ROOM/CARE - MED/SURG/GYN PRIV*

## 2017-02-21 RX ORDER — OXYCODONE AND ACETAMINOPHEN 10; 325 MG/1; MG/1
1-2 TABLET ORAL EVERY 4 HOURS PRN
Status: DISCONTINUED | OUTPATIENT
Start: 2017-02-21 | End: 2017-02-22 | Stop reason: HOSPADM

## 2017-02-21 RX ADMIN — GABAPENTIN 600 MG: 300 CAPSULE ORAL at 20:06

## 2017-02-21 RX ADMIN — CEFAZOLIN SODIUM 2 G: 2 INJECTION, SOLUTION INTRAVENOUS at 09:04

## 2017-02-21 RX ADMIN — OXYCODONE HYDROCHLORIDE AND ACETAMINOPHEN 2 TABLET: 10; 325 TABLET ORAL at 22:21

## 2017-02-21 RX ADMIN — DOCUSATE SODIUM 100 MG: 100 CAPSULE ORAL at 09:05

## 2017-02-21 RX ADMIN — OXYCODONE HYDROCHLORIDE AND ACETAMINOPHEN 2 TABLET: 10; 325 TABLET ORAL at 18:23

## 2017-02-21 RX ADMIN — GABAPENTIN 600 MG: 300 CAPSULE ORAL at 16:11

## 2017-02-21 RX ADMIN — OXYCODONE HYDROCHLORIDE AND ACETAMINOPHEN 1 TABLET: 10; 325 TABLET ORAL at 09:05

## 2017-02-21 RX ADMIN — GABAPENTIN 600 MG: 300 CAPSULE ORAL at 09:04

## 2017-02-21 RX ADMIN — INFLUENZA A VIRUS A/CALIFORNIA/7/2009 X-179A (H1N1) ANTIGEN (FORMALDEHYDE INACTIVATED), INFLUENZA A VIRUS A/HONG KONG/4801/2014 X-263B (H3N2) ANTIGEN (FORMALDEHYDE INACTIVATED), INFLUENZA B VIRUS B/PHUKET/3073/2013 ANTIGEN (FORMALDEHYDE INACTIVATED), AND INFLUENZA B VIRUS B/BRISBANE/60/2008 ANTIGEN (FORMALDEHYDE INACTIVATED) 0.5 ML: 15; 15; 15; 15 INJECTION, SUSPENSION INTRAMUSCULAR at 08:59

## 2017-02-21 RX ADMIN — CEFAZOLIN SODIUM 2 G: 2 INJECTION, SOLUTION INTRAVENOUS at 00:22

## 2017-02-21 RX ADMIN — OXYCODONE HYDROCHLORIDE AND ACETAMINOPHEN 2 TABLET: 10; 325 TABLET ORAL at 13:30

## 2017-02-21 ASSESSMENT — LIFESTYLE VARIABLES
EVER FELT BAD OR GUILTY ABOUT YOUR DRINKING: NO
TOTAL SCORE: 0
EVER_SMOKED: NEVER
AVERAGE NUMBER OF DAYS PER WEEK YOU HAVE A DRINK CONTAINING ALCOHOL: 0
TOTAL SCORE: 0
HAVE PEOPLE ANNOYED YOU BY CRITICIZING YOUR DRINKING: NO
ON A TYPICAL DAY WHEN YOU DRINK ALCOHOL HOW MANY DRINKS DO YOU HAVE: 0
EVER HAD A DRINK FIRST THING IN THE MORNING TO STEADY YOUR NERVES TO GET RID OF A HANGOVER: NO
TOTAL SCORE: 0
HOW MANY TIMES IN THE PAST YEAR HAVE YOU HAD 5 OR MORE DRINKS IN A DAY: 0
CONSUMPTION TOTAL: NEGATIVE
ALCOHOL_USE: YES
HAVE YOU EVER FELT YOU SHOULD CUT DOWN ON YOUR DRINKING: NO

## 2017-02-21 ASSESSMENT — ACTIVITIES OF DAILY LIVING (ADL): TOILETING: INDEPENDENT

## 2017-02-21 ASSESSMENT — PAIN SCALES - GENERAL
PAINLEVEL_OUTOF10: 6
PAINLEVEL_OUTOF10: 6
PAINLEVEL_OUTOF10: 5
PAINLEVEL_OUTOF10: 9
PAINLEVEL_OUTOF10: 5
PAINLEVEL_OUTOF10: 5

## 2017-02-21 ASSESSMENT — GAIT ASSESSMENTS
DISTANCE (FEET): 125
GAIT LEVEL OF ASSIST: SUPERVISED

## 2017-02-21 NOTE — CARE PLAN
Problem: Safety  Goal: Will remain free from falls  Outcome: PROGRESSING AS EXPECTED  Anabella Neves educated about fall risk. Anabella will remain free from injury or falls. Appropriate side rails up. Bed in low position, call light and possions within reach, bed alarm in use. Hourly rounding in place.    Problem: Pain Management  Goal: Pain level will decrease to patient’s comfort goal  Outcome: PROGRESSING AS EXPECTED  Following pain management plan, patient reports pain on 0-10 scale

## 2017-02-21 NOTE — OR SURGEON
Immediate Post-Operative Note      PreOp Diagnosis: Right L5 radiculopathy, sp lumbar fusion    PostOp Diagnosis: same    Procedure(s):  POSTERIOR RIGHT SIDE REMOVAL OF HARDWARE, LUMBAR DECOMPRESSION L4-S1  DECOMPRESSION OF NERVE ROOT   - Wound Class: Clean with Drain    Surgeon(s):  Shawn Carter M.D.    Anesthesiologist/Type of Anesthesia:  Anesthesiologist: Eliceo Arrieta M.D./General    Surgical Staff:  Assistant: SANJANA Patrick  Circulator: Lillie Anthony R.N.  Relief Scrub: Noreen Peralta Jr.  Scrub Person: Jhon Roman  Radiology Technician: Chiara Issa    Specimen: none    Estimated Blood Loss: 200cc    Findings: lateral recess stenosis, Loosened screws on the right    Complications: none        2/20/2017 8:36 PM Shawn Carter

## 2017-02-21 NOTE — THERAPY
"Occupational Therapy Evaluation completed.   Functional Status:  Supv supine > < EOB, supv transfers without AD, supv toileting/LB dressing/standing grooming   Plan of Care: Patient with no further skilled OT needs in the acute care setting at this time  Discharge Recommendations:  Equipment: No Equipment Needed. Post-acute therapy recommended after discharged home.    See \"Rehab Therapy-Acute\" Patient Summary Report for complete documentation.    56 y.o. female s/p L4-S1 decompression, revision of fusion. OT provided training/education on safe body mechanics/neurtral spine during functional activity as well as compensatory techniques. Good demo and understanding. Pt is able to complete basic ADL and mobility with no more than supv. Family available as needed. No further acute OT needs at this time.     "

## 2017-02-21 NOTE — PROGRESS NOTES
"Neurosurgery :   Pt in bed, states she is improved some, has some pain across her low back and some to right ant thigh, ambulating, but does mention diarrhea this am with loss of control, +pca    Exam:   AAO, cooperative, df/pf- 5/5, incision- c/d, hvac- 180ml      Blood pressure 88/48, pulse 54, temperature 36.4 °C (97.6 °F), resp. rate 16, height 1.676 m (5' 5.98\"), weight 80.5 kg (177 lb 7.5 oz), last menstrual period 06/01/2007, SpO2 99 %.        No results for input(s): SODIUM, POTASSIUM, CHLORIDE, CO2, GLUCOSE, BUN, CPKTOTAL in the last 72 hours.          Date 02/21/17 0700 - 02/22/17 0659   Shift [id number] 1997-2877 0828-2946 24 Hour Total   I  N  T  A  K  E   Shift Total       O  U  T  P  U  T   Urine 540   540      Indwelling Cathether 540   540    Stool          Number of Times Stooled 0 x   0 x    Shift Total 540   540   NET -540   -540       Intake/Output Summary (Last 24 hours) at 02/21/17 1018  Last data filed at 02/21/17 1012   Gross per 24 hour   Intake    939 ml   Output   1070 ml   Net   -131 ml         • oxycodone-acetaminophen  1-2 Tab     • gabapentin  600 mg     • hydrochlorothiazide  25 mg     • methocarbamol  750 mg     • metoprolol SR  50 mg     • MD ALERT...Do not administer NSAIDS or ASPIRIN unless ORDERED By Neurosurgery  1 Each     • docusate sodium  100 mg     • senna-docusate  1 Tab     • polyethylene glycol/lytes  1 Packet     • magnesium hydroxide  30 mL     • bisacodyl  10 mg     • fleet  1 Each     • 0.9 % NaCl with KCl 20 mEq 1,000 mL   100 mL/hr at 02/20/17 7425   • diphenhydrAMINE  25 mg      Or   • diphenhydrAMINE  25 mg     • ondansetron  4 mg     • ondansetron  4 mg     • promethazine  12.5-25 mg     • promethazine  12.5-25 mg     • prochlorperazine  5-10 mg           Assessment and Plan:  POD # 1 s/p  L4-5 exploration of fusion, redo L4-S1 medial facetectomy and foraminotomy  NM as above  PT/OT  Dc pca, begin orals  Monitor drain  Monitor for incontinence      Imaging " results  [unfilled]

## 2017-02-21 NOTE — OP REPORT
DATE OF SERVICE:  02/20/2017    PREOPERATIVE DIAGNOSES:  1.  Status post L4-L5 lumbar fusion.  2.  Recurrent right-sided radiculopathy in the L5 distribution with low back   pain.  3.  Possible pseudoarthrosis and loosening of hardware on the right.    POSTOPERATIVE DIAGNOSES:  1.  Status post L4-L5 lumbar fusion.  2.  Recurrent right-sided radiculopathy in the L5 distribution with low back   pain.  3.  Possible pseudoarthrosis and loosening of hardware on the right.    PROCEDURES PERFORMED:  1.  Exploration of fusion L4-L5, with removal of hardware on the right.  2.  Redo medial facetectomy with foraminotomies at L4-L5 and L5-S1 with   decompression respectively of L4, L5, and S1 nerve roots on the right.  3.  Redo posterolateral arthrodesis with use of locally harvested bone graft   and [employer]tronic bag of bone allograft, bilateral.    SURGEON:  Shawn Carter M.D.    ASSISTANT:  PAT Mireles.    ANESTHESIA:  General anesthetic.    ANESTHESIOLOGIST:   ____.    PREPARATION:  Routine.    DESCRIPTION OF PROCEDURE:  Patient was brought to the operating room, and   following induction, she was intubated and placed under general anesthetic,   rolled prone onto the operating table.  All pressure points were padded.    Sequential stockings were in place.  Back was prepped and draped in sterile   fashion.  Proposed incision site was injected with 20 mL of Marcaine 0.5% with   epinephrine.  Midline incision was made and carried down to subcutaneous   tissue.  We did a subperiosteal dissection at L3 and then dissected over the   instrumentation at L4-L5 bilaterally with retractors placed to maintain   exposure, we were able to dissect the thecal sac free on the right hand side,   and found that the hardware was loose.  We removed the set screws, followed by   removal of hardware and we were able to shave the medial facet at L4-L5 and   L5-S1 to a thin shelf.  We decompressed around the respective pedicles of 3,    4, and 5 after dissecting the scar tissue free, carefully with curettes.  We   found severe stenosis at L4-L5 and compression of the L4 nerve root.  This   probably occurred when the patient had a fall, and I think that her hardware   had inferiorly displaced.    We dissected out the transverse process of L4-L5.  This was somewhat difficult   due to the former scar tissue here.  We decorticated this area, packed   locally harvested bone graft plus a 5 mL bag of bones here.  Going to the   contralateral side, the instrumentation was in good position.    We were able to dissect out the transverse processes to some degree and   decorticate this area and placed back to bone on this side.  Medium size   Hemovac was placed.  We closed the fascia with #1 Vicryl suture, subcutaneous   tissue with 2-0 Vicryl, subcuticular with 3-0 Vicryl, and skin with   Steri-Strips.  All the sponge and needle counts were correct.  Estimated blood   loss was approximately 100 mL to 200 mL.  Instability that would require   re-instrumentation, we made sure that the nerve roots were free on the right   ____.       ____________________________________     MD TORRIE LANDA / TAN    DD:  02/20/2017 20:42:01  DT:  02/20/2017 22:54:47    D#:  [id number]  Job#:  [id number]

## 2017-02-21 NOTE — PROGRESS NOTES
"Anabella Trish Neves assessed after assuming care upon arrival . no signs of acute distress and appears to be in stable condition. Last documented VS: /77 mmHg  Pulse 61  Temp(Src) 37.2 °C (99 °F)  Resp 16  Ht 1.676 m (5' 5.98\")  Wt 78.2 kg (172 lb 6.4 oz)  BMI 27.84 kg/m2  SpO2 99%  LMP 06/01/2007      Mobility: Patient in bed at this time, not mobilized due to pain    Treaded slippers on bed alarm on. Hourly rounding in place and explained to Anabella. Educated Anabella on call light use as well as phone instructions to call RN or CNA directly. Possessions within patient's reach, fall precautions in place.     Anabella educated on Pain, Position, Potty, Priorities and instructed to notify RN if anything additional can be done to make stay more comfortable including linen change and toiletries.     *addendum to follow below at end or throughout shift if significant events occurred: if blank n/a    OVERNIGHT SIGNIFICANT EVENTS:  n/a      "

## 2017-02-21 NOTE — THERAPY
"Physical Therapy Evaluation completed.   Bed Mobility:  Supine to Sit: Supervised  Transfers: Sit to Stand: Supervised  Gait: Level Of Assist: Supervised with No Equipment Needed       Plan of Care: Patient with no further skilled PT needs in the acute care setting at this time  Discharge Recommendations: Equipment: No Equipment Needed. Post-acute therapy recommended after discharged home.  Pt presents with tolerance for ambulation and stairs with no balance or strength issues. Pt has good understanding of spinal precautions and good help at home. No further inpt PT needs.   Nsg to clarify brace or not:  no brace ordered but pt reports discussion with Dr. Carter where he mentioned wanting pt in a fitted brace.       See \"Rehab Therapy-Acute\" Patient Summary Report for complete documentation.     "

## 2017-02-22 VITALS
HEART RATE: 59 BPM | WEIGHT: 177.47 LBS | RESPIRATION RATE: 16 BRPM | OXYGEN SATURATION: 95 % | DIASTOLIC BLOOD PRESSURE: 48 MMHG | BODY MASS INDEX: 28.52 KG/M2 | TEMPERATURE: 98.2 F | SYSTOLIC BLOOD PRESSURE: 84 MMHG | HEIGHT: 66 IN

## 2017-02-22 PROCEDURE — 700112 HCHG RX REV CODE 229: Performed by: NURSE PRACTITIONER

## 2017-02-22 PROCEDURE — 700102 HCHG RX REV CODE 250 W/ 637 OVERRIDE(OP): Performed by: NURSE PRACTITIONER

## 2017-02-22 PROCEDURE — A9270 NON-COVERED ITEM OR SERVICE: HCPCS | Performed by: NURSE PRACTITIONER

## 2017-02-22 PROCEDURE — 700102 HCHG RX REV CODE 250 W/ 637 OVERRIDE(OP): Performed by: NEUROLOGICAL SURGERY

## 2017-02-22 PROCEDURE — A9270 NON-COVERED ITEM OR SERVICE: HCPCS | Performed by: NEUROLOGICAL SURGERY

## 2017-02-22 RX ORDER — PSEUDOEPHEDRINE HYDROCHLORIDE 60 MG/1
60 TABLET, FILM COATED ORAL EVERY 6 HOURS
Status: DISCONTINUED | OUTPATIENT
Start: 2017-02-22 | End: 2017-02-22 | Stop reason: HOSPADM

## 2017-02-22 RX ORDER — OXYCODONE AND ACETAMINOPHEN 10; 325 MG/1; MG/1
1-2 TABLET ORAL EVERY 4 HOURS PRN
Qty: 80 TAB | Refills: 0 | Status: SHIPPED | OUTPATIENT
Start: 2017-02-22 | End: 2019-01-03

## 2017-02-22 RX ADMIN — DOCUSATE SODIUM 100 MG: 100 CAPSULE ORAL at 09:02

## 2017-02-22 RX ADMIN — OXYCODONE HYDROCHLORIDE AND ACETAMINOPHEN 2 TABLET: 10; 325 TABLET ORAL at 06:52

## 2017-02-22 RX ADMIN — OXYCODONE HYDROCHLORIDE AND ACETAMINOPHEN 2 TABLET: 10; 325 TABLET ORAL at 02:19

## 2017-02-22 RX ADMIN — OXYCODONE HYDROCHLORIDE AND ACETAMINOPHEN 2 TABLET: 10; 325 TABLET ORAL at 11:07

## 2017-02-22 RX ADMIN — GABAPENTIN 600 MG: 300 CAPSULE ORAL at 09:02

## 2017-02-22 ASSESSMENT — PAIN SCALES - GENERAL
PAINLEVEL_OUTOF10: 5
PAINLEVEL_OUTOF10: 7
PAINLEVEL_OUTOF10: 5
PAINLEVEL_OUTOF10: 6

## 2017-02-22 NOTE — PROGRESS NOTES
Discharge instructions given to pt and . All questions answered. Refuse wheelchair despite education. Pt left with . All belongings taken.

## 2017-02-22 NOTE — PROGRESS NOTES
Patient refusing bed alarm despite education regarding fall risks. Other fall interventions in place. Patient calling appropriately for assistance.

## 2017-02-22 NOTE — CARE PLAN
Problem: Communication  Goal: The ability to communicate needs accurately and effectively will improve  Outcome: PROGRESSING AS EXPECTED  Uses call light to communicate needs appropriately.    Problem: Safety  Goal: Will remain free from injury  Outcome: PROGRESSING AS EXPECTED  Call light within reach, hourly rounding in practice.

## 2017-02-22 NOTE — PROGRESS NOTES
"Neurosurgery :   Pt ambulating in hallway, feels better today, but didn't sleep well    Exam:   AAO, cooperative, df/pf- 5/5, incision- c/d, hvac- 160ml overnight      Blood pressure 84/48, pulse 59, temperature 36.8 °C (98.2 °F), resp. rate 16, height 1.676 m (5' 5.98\"), weight 80.5 kg (177 lb 7.5 oz), last menstrual period 06/01/2007, SpO2 95 %.        No results for input(s): SODIUM, POTASSIUM, CHLORIDE, CO2, GLUCOSE, BUN, CPKTOTAL in the last 72 hours.          Date 02/22/17 0700 - 02/23/17 0659   Shift 2226-2952 0522-8791 4803-4811 24 Hour Total   I  N  T  A  K  E   Shift Total       O  U  T  P  U  T   Stool          Number of Times Stooled 0 x   0 x    Shift Total       NET           Intake/Output Summary (Last 24 hours) at 02/22/17 1010  Last data filed at 02/22/17 0600   Gross per 24 hour   Intake    360 ml   Output    775 ml   Net   -415 ml         • pseudoephedrine  60 mg     • oxycodone-acetaminophen  1-2 Tab     • gabapentin  600 mg     • hydrochlorothiazide  25 mg     • methocarbamol  750 mg     • metoprolol SR  50 mg     • MD ALERT...Do not administer NSAIDS or ASPIRIN unless ORDERED By Neurosurgery  1 Each     • docusate sodium  100 mg     • senna-docusate  1 Tab     • polyethylene glycol/lytes  1 Packet     • magnesium hydroxide  30 mL     • bisacodyl  10 mg     • fleet  1 Each     • 0.9 % NaCl with KCl 20 mEq 1,000 mL   100 mL/hr at 02/20/17 6964   • diphenhydrAMINE  25 mg      Or   • diphenhydrAMINE  25 mg     • ondansetron  4 mg     • ondansetron  4 mg     • promethazine  12.5-25 mg     • promethazine  12.5-25 mg     • prochlorperazine  5-10 mg           Assessment and Plan:  POD # 2 s/p  L4-5 exploration of fusion, redo L4-S1 medial facetectomy and foraminotomy  NM as above  PT/OT  Dc drain  Dc home later today      Imaging results  [unfilled]    "

## 2017-02-22 NOTE — CARE PLAN
Problem: Pain Management  Goal: Pain level will decrease to patient’s comfort goal  Outcome: PROGRESSING AS EXPECTED  Pain assessed q2-4 hours. PRN pain medication ordered/administered per MAR. Other comfort measures provided.    Problem: Mobility  Goal: Risk for activity intolerance will decrease  Outcome: PROGRESSING AS EXPECTED  Patient ambulating unit frequently with standby assist and occasional use of FWW.

## 2017-02-22 NOTE — PROGRESS NOTES
Patient seen Aox4 and ambulating hallways with .  Updated patient on POC. Verbalizes understanding.   Patient reporting back pain rating it a 5 out of 10. Will medicated patient per MAR.  Patient also reporting some numbness/tingling to RLE however, states pain has greatly improved.  Hemovac in place to lower back and set to self suction. Dressing appears CDI.  All needs addressed at this time. Call light and personal belongings within reach, bed in lowest position, and hourly rounding done.

## 2017-02-22 NOTE — DISCHARGE INSTRUCTIONS
Discharge Instructions    Discharged to home by car with relative. Discharged via wheelchair, hospital escort: Yes.  Special equipment needed: Not Applicable    Be sure to schedule a follow-up appointment with your primary care doctor or any specialists as instructed.     Discharge Plan:   Diet Plan: Discussed  Activity Level: Discussed  Confirmed Follow up Appointment: Patient to Call and Schedule Appointment  Confirmed Symptoms Management: Discussed  Medication Reconciliation Updated: Yes  Influenza Vaccine Indication: Indicated: Adults > or equal to 18 years of age with severe allergy to eggs (Flublok vaccine)  Influenza Vaccine Given - only chart on this line when given: Influenza Vaccine Given (See MAR)    I understand that a diet low in cholesterol, fat, and sodium is recommended for good health. Unless I have been given specific instructions below for another diet, I accept this instruction as my diet prescription.   Other diet: none    Special Instructions:   Ambulate as much as comfortable  Ok to shower 2/23, pat incision dry, leave open to air- no dressing   No Aspirin or non-steroidal anti-inflammatory medications (aleve, motrin, ibuprofen, celebrex)  No driving for 2 weeks/ No driving while on narcotic medication  Over the counter stool softeners daily while on narcotics  No lifting greater than 15 pounds, no repetitive bending or twisting  Follow up at [facility] 2 weeks after surgery    · Is patient discharged on Warfarin / Coumadin?   No     · Is patient Post Blood Transfusion?  No    Depression / Suicide Risk    As you are discharged from this [facility] facility, it is important to learn how to keep safe from harming yourself.    Recognize the warning signs:  · Abrupt changes in personality, positive or negative- including increase in energy   · Giving away possessions  · Change in eating patterns- significant weight changes-  positive or negative  · Change in sleeping patterns- unable  to sleep or sleeping all the time   · Unwillingness or inability to communicate  · Depression  · Unusual sadness, discouragement and loneliness  · Talk of wanting to die  · Neglect of personal appearance   · Rebelliousness- reckless behavior  · Withdrawal from people/activities they love  · Confusion- inability to concentrate     If you or a loved one observes any of these behaviors or has concerns about self-harm, here's what you can do:  · Talk about it- your feelings and reasons for harming yourself  · Remove any means that you might use to hurt yourself (examples: pills, rope, extension cords, firearm)  · Get professional help from the community (Mental Health, Substance Abuse, psychological counseling)  · Do not be alone:Call your Safe Contact- someone whom you trust who will be there for you.  · Call your local CRISIS HOTLINE 724-9522 or 175-706-9387  · Call your local Children's Mobile Crisis Response Team [place] (945) 176-4286 or www.AllBusiness.com  · Call the toll free National Suicide Prevention Hotlines   · National Suicide Prevention Lifeline 531-333-JHLF (8411)  · National Hope Line Network 800-SUICIDE (782-3575)

## 2017-02-23 NOTE — DISCHARGE SUMMARY
SURGEON:  Shawn Carter MD    PROCEDURE PERFORMED:  Exploration of fusion at L4-L5 with redo medial   facetectomy and foraminotomies at L4-L5 and L5-S1 and redo posterolateral   arthrodesis.    POSTOPERATIVE DIAGNOSES:  Status post L4-L5 lumbar fusion and recurrent   right-sided radiculopathy and low back pain.    HISTORY OF PRESENT ILLNESS:  Has been previously dictated.    HOSPITAL COURSE:  Postoperative day #1, the patient was seen, she was in bed.    She does state she has improved some.  She has some pain across her low back   and some to her right anterior thigh.  She was ambulating, but does mention   she had some diarrhea that morning with some loss of control.  She remains on   a PCA.  On exam, her dorsiflexion and plantarflexion was 5/5.  Her incision   was clean and dry.  Her Hemovac had produced 180 mL.  Her PCA was stopped and   oral medications were started.  The drain was continued to monitor as well as   monitoring for incontinence.  Postoperative day #2, she is up and ambulating   in the hallway.  She does feel better today, but she did not sleep well.  Her   exam remains unchanged with dorsiflexion and plantar flexion at 5/5.  Her   incision was clean, dry, and intact with the dressing.  Her Hemovac had   produced 160 mL overnight.  Drain will be discontinued.  She will continue to   mobilize and she will be discharged home later today if her pain remained   under control.    DISCHARGE INSTRUCTIONS:  She should ambulate as much as comfortable for her.    She may shower tomorrow and pat the incision dry and leave open to air.  No   dressing will be needed after that point.  She is not to take aspirin or   nonsteroidal anti-inflammatory medications.  She is not to drive for 2 weeks   while on narcotic medications.  She should continue over-the-counter stool   softeners while taking narcotics.  She is not to lift greater than 15 pounds,   no repetitive bending or twisting, and she should follow up in  the office 2   weeks after surgery.    DISCHARGE MEDICATIONS:  She is being given Percocet 10/325 one to two tabs   every 4 hours as needed for pain, #80.  She can continue her gabapentin,   hydrochlorothiazide, methocarbamol and metoprolol.  She is to stop taking   previous doses of Norco.       ____________________________________     PAT CAMEJO / TAN    DD:  02/22/2017 10:20:25  DT:  02/22/2017 14:26:46    D#:  [id number]  Job#:  [id number]    cc: [facility]

## 2017-03-07 ENCOUNTER — HOSPITAL ENCOUNTER (OUTPATIENT)
Dept: RADIOLOGY | Facility: MEDICAL CENTER | Age: 57
End: 2017-03-07
Attending: NURSE PRACTITIONER
Payer: COMMERCIAL

## 2017-03-07 ENCOUNTER — HOSPITAL ENCOUNTER (OUTPATIENT)
Dept: RADIOLOGY | Facility: MEDICAL CENTER | Age: 57
End: 2017-03-07
Attending: NEUROLOGICAL SURGERY
Payer: COMMERCIAL

## 2017-03-07 DIAGNOSIS — M48.061 SPINAL STENOSIS, LUMBAR REGION, WITHOUT NEUROGENIC CLAUDICATION: ICD-10-CM

## 2017-03-07 PROCEDURE — 72148 MRI LUMBAR SPINE W/O DYE: CPT

## 2017-03-07 PROCEDURE — 72110 X-RAY EXAM L-2 SPINE 4/>VWS: CPT

## 2018-05-02 ENCOUNTER — HOSPITAL ENCOUNTER (EMERGENCY)
Dept: HOSPITAL 8 - ED | Age: 58
Discharge: HOME | End: 2018-05-02
Payer: COMMERCIAL

## 2018-05-02 VITALS — WEIGHT: 160.06 LBS | BODY MASS INDEX: 25.72 KG/M2 | HEIGHT: 66 IN

## 2018-05-02 VITALS — SYSTOLIC BLOOD PRESSURE: 158 MMHG | DIASTOLIC BLOOD PRESSURE: 133 MMHG

## 2018-05-02 DIAGNOSIS — I10: ICD-10-CM

## 2018-05-02 DIAGNOSIS — F11.23: Primary | ICD-10-CM

## 2018-05-02 LAB
ALBUMIN SERPL-MCNC: 4.2 G/DL (ref 3.4–5)
ALP SERPL-CCNC: 79 U/L (ref 45–117)
ALT SERPL-CCNC: 13 U/L (ref 12–78)
ANION GAP SERPL CALC-SCNC: 10 MMOL/L (ref 5–15)
BASOPHILS # BLD AUTO: 0.07 X10^3/UL (ref 0–0.1)
BASOPHILS NFR BLD AUTO: 1 % (ref 0–1)
BILIRUB SERPL-MCNC: 0.7 MG/DL (ref 0.2–1)
CALCIUM SERPL-MCNC: 8.9 MG/DL (ref 8.5–10.1)
CHLORIDE SERPL-SCNC: 110 MMOL/L (ref 98–107)
CREAT SERPL-MCNC: 0.63 MG/DL (ref 0.55–1.02)
EOSINOPHIL # BLD AUTO: 0.03 X10^3/UL (ref 0–0.4)
EOSINOPHIL NFR BLD AUTO: 1 % (ref 1–7)
ERYTHROCYTE [DISTWIDTH] IN BLOOD BY AUTOMATED COUNT: 12.2 % (ref 9.6–15.2)
LYMPHOCYTES # BLD AUTO: 1.29 X10^3/UL (ref 1–3.4)
LYMPHOCYTES NFR BLD AUTO: 23 % (ref 22–44)
MCH RBC QN AUTO: 33.2 PG (ref 27–34.8)
MCHC RBC AUTO-ENTMCNC: 34.3 G/DL (ref 32.4–35.8)
MCV RBC AUTO: 96.9 FL (ref 80–100)
MD: NO
MONOCYTES # BLD AUTO: 0.28 X10^3/UL (ref 0.2–0.8)
MONOCYTES NFR BLD AUTO: 5 % (ref 2–9)
NEUTROPHILS # BLD AUTO: 3.95 X10^3/UL (ref 1.8–6.8)
NEUTROPHILS NFR BLD AUTO: 70 % (ref 42–75)
PLATELET # BLD AUTO: 242 X10^3/UL (ref 130–400)
PMV BLD AUTO: 6.8 FL (ref 7.4–10.4)
PROT SERPL-MCNC: 7 G/DL (ref 6.4–8.2)
RBC # BLD AUTO: 4.27 X10^6/UL (ref 3.82–5.3)

## 2018-05-02 PROCEDURE — 96374 THER/PROPH/DIAG INJ IV PUSH: CPT

## 2018-05-02 PROCEDURE — 80053 COMPREHEN METABOLIC PANEL: CPT

## 2018-05-02 PROCEDURE — 36415 COLL VENOUS BLD VENIPUNCTURE: CPT

## 2018-05-02 PROCEDURE — 85025 COMPLETE CBC W/AUTO DIFF WBC: CPT

## 2018-05-02 PROCEDURE — 99285 EMERGENCY DEPT VISIT HI MDM: CPT

## 2018-05-02 PROCEDURE — 96361 HYDRATE IV INFUSION ADD-ON: CPT

## 2018-08-27 ENCOUNTER — HOSPITAL ENCOUNTER (OUTPATIENT)
Facility: MEDICAL CENTER | Age: 58
End: 2018-08-27
Attending: NEUROLOGICAL SURGERY | Admitting: NEUROLOGICAL SURGERY
Payer: COMMERCIAL

## 2018-10-02 ENCOUNTER — HOSPITAL ENCOUNTER (OUTPATIENT)
Dept: RADIOLOGY | Facility: MEDICAL CENTER | Age: 58
End: 2018-10-02
Attending: NEUROLOGICAL SURGERY
Payer: COMMERCIAL

## 2018-10-02 DIAGNOSIS — Z13.820 ENCOUNTER FOR SCREENING FOR OSTEOPOROSIS: ICD-10-CM

## 2018-10-02 PROCEDURE — 77080 DXA BONE DENSITY AXIAL: CPT

## 2018-10-11 ENCOUNTER — HOSPITAL ENCOUNTER (OUTPATIENT)
Dept: RADIOLOGY | Facility: MEDICAL CENTER | Age: 58
End: 2018-10-11
Attending: NEUROLOGICAL SURGERY
Payer: COMMERCIAL

## 2018-10-11 VITALS — WEIGHT: 158.29 LBS | BODY MASS INDEX: 25.44 KG/M2 | HEIGHT: 66 IN

## 2018-10-11 DIAGNOSIS — Z01.812 PRE-OPERATIVE LABORATORY EXAMINATION: ICD-10-CM

## 2018-10-11 DIAGNOSIS — Z01.810 PRE-OPERATIVE CARDIOVASCULAR EXAMINATION: ICD-10-CM

## 2018-10-11 DIAGNOSIS — Z01.811 PRE-OPERATIVE RESPIRATORY EXAMINATION: ICD-10-CM

## 2018-10-11 LAB
ANION GAP SERPL CALC-SCNC: 8 MMOL/L (ref 0–11.9)
APPEARANCE UR: CLEAR
APTT PPP: 27.5 SEC (ref 24.7–36)
BASOPHILS # BLD AUTO: 0.9 % (ref 0–1.8)
BASOPHILS # BLD: 0.04 K/UL (ref 0–0.12)
BILIRUB UR QL STRIP.AUTO: NEGATIVE
BUN SERPL-MCNC: 18 MG/DL (ref 8–22)
CALCIUM SERPL-MCNC: 10.4 MG/DL (ref 8.5–10.5)
CHLORIDE SERPL-SCNC: 107 MMOL/L (ref 96–112)
CO2 SERPL-SCNC: 26 MMOL/L (ref 20–33)
COLOR UR: YELLOW
CREAT SERPL-MCNC: 0.88 MG/DL (ref 0.5–1.4)
EKG IMPRESSION: NORMAL
EOSINOPHIL # BLD AUTO: 0.15 K/UL (ref 0–0.51)
EOSINOPHIL NFR BLD: 3.5 % (ref 0–6.9)
ERYTHROCYTE [DISTWIDTH] IN BLOOD BY AUTOMATED COUNT: 42.3 FL (ref 35.9–50)
GLUCOSE SERPL-MCNC: 95 MG/DL (ref 65–99)
GLUCOSE UR STRIP.AUTO-MCNC: NEGATIVE MG/DL
HCT VFR BLD AUTO: 39.5 % (ref 37–47)
HGB BLD-MCNC: 14.1 G/DL (ref 12–16)
IMM GRANULOCYTES # BLD AUTO: 0.01 K/UL (ref 0–0.11)
IMM GRANULOCYTES NFR BLD AUTO: 0.2 % (ref 0–0.9)
INR PPP: 1.02 (ref 0.87–1.13)
KETONES UR STRIP.AUTO-MCNC: NEGATIVE MG/DL
LEUKOCYTE ESTERASE UR QL STRIP.AUTO: NEGATIVE
LYMPHOCYTES # BLD AUTO: 1.27 K/UL (ref 1–4.8)
LYMPHOCYTES NFR BLD: 30 % (ref 22–41)
MCH RBC QN AUTO: 34.4 PG (ref 27–33)
MCHC RBC AUTO-ENTMCNC: 35.7 G/DL (ref 33.6–35)
MCV RBC AUTO: 96.3 FL (ref 81.4–97.8)
MICRO URNS: NORMAL
MONOCYTES # BLD AUTO: 0.23 K/UL (ref 0–0.85)
MONOCYTES NFR BLD AUTO: 5.4 % (ref 0–13.4)
NEUTROPHILS # BLD AUTO: 2.54 K/UL (ref 2–7.15)
NEUTROPHILS NFR BLD: 60 % (ref 44–72)
NITRITE UR QL STRIP.AUTO: NEGATIVE
NRBC # BLD AUTO: 0 K/UL
NRBC BLD-RTO: 0 /100 WBC
PH UR STRIP.AUTO: 5.5 [PH]
PLATELET # BLD AUTO: 223 K/UL (ref 164–446)
PMV BLD AUTO: 9 FL (ref 9–12.9)
POTASSIUM SERPL-SCNC: 4.1 MMOL/L (ref 3.6–5.5)
PROT UR QL STRIP: NEGATIVE MG/DL
PROTHROMBIN TIME: 13.5 SEC (ref 12–14.6)
RBC # BLD AUTO: 4.1 M/UL (ref 4.2–5.4)
RBC UR QL AUTO: NEGATIVE
SODIUM SERPL-SCNC: 141 MMOL/L (ref 135–145)
SP GR UR STRIP.AUTO: 1.03
UROBILINOGEN UR STRIP.AUTO-MCNC: 0.2 MG/DL
WBC # BLD AUTO: 4.2 K/UL (ref 4.8–10.8)

## 2018-10-11 PROCEDURE — 93005 ELECTROCARDIOGRAM TRACING: CPT

## 2018-10-11 PROCEDURE — 71045 X-RAY EXAM CHEST 1 VIEW: CPT

## 2018-10-11 PROCEDURE — 36415 COLL VENOUS BLD VENIPUNCTURE: CPT

## 2018-10-11 PROCEDURE — 93010 ELECTROCARDIOGRAM REPORT: CPT | Performed by: INTERNAL MEDICINE

## 2018-10-11 PROCEDURE — 85730 THROMBOPLASTIN TIME PARTIAL: CPT

## 2018-10-11 PROCEDURE — 85610 PROTHROMBIN TIME: CPT

## 2018-10-11 PROCEDURE — 85025 COMPLETE CBC W/AUTO DIFF WBC: CPT

## 2018-10-11 PROCEDURE — 80048 BASIC METABOLIC PNL TOTAL CA: CPT

## 2018-10-11 PROCEDURE — 81003 URINALYSIS AUTO W/O SCOPE: CPT

## 2018-10-11 RX ORDER — OMEGA-3 FATTY ACIDS/FISH OIL 300-1000MG
CAPSULE ORAL PRN
COMMUNITY
End: 2018-10-16 | Stop reason: HOSPADM

## 2018-10-11 RX ORDER — LEVETIRACETAM 500 MG/1
500 TABLET ORAL 2 TIMES DAILY
COMMUNITY
End: 2018-10-16 | Stop reason: HOSPADM

## 2018-10-11 RX ORDER — TIZANIDINE 4 MG/1
4 TABLET ORAL EVERY 6 HOURS PRN
COMMUNITY
End: 2018-10-16 | Stop reason: HOSPADM

## 2018-10-18 ENCOUNTER — OFFICE VISIT (OUTPATIENT)
Dept: MEDICAL GROUP | Facility: LAB | Age: 58
End: 2018-10-18
Payer: COMMERCIAL

## 2018-10-18 VITALS
WEIGHT: 164 LBS | BODY MASS INDEX: 26.36 KG/M2 | SYSTOLIC BLOOD PRESSURE: 160 MMHG | RESPIRATION RATE: 16 BRPM | HEART RATE: 66 BPM | TEMPERATURE: 98.9 F | DIASTOLIC BLOOD PRESSURE: 106 MMHG | HEIGHT: 66 IN | OXYGEN SATURATION: 100 %

## 2018-10-18 DIAGNOSIS — E78.5 DYSLIPIDEMIA: ICD-10-CM

## 2018-10-18 DIAGNOSIS — M47.817 LUMBAR AND SACRAL SPONDYLARTHRITIS: ICD-10-CM

## 2018-10-18 DIAGNOSIS — Z13.29 SCREENING FOR THYROID DISORDER: ICD-10-CM

## 2018-10-18 DIAGNOSIS — Z13.21 ENCOUNTER FOR VITAMIN DEFICIENCY SCREENING: ICD-10-CM

## 2018-10-18 DIAGNOSIS — Z76.89 ESTABLISHING CARE WITH NEW DOCTOR, ENCOUNTER FOR: ICD-10-CM

## 2018-10-18 DIAGNOSIS — M41.20 OTHER IDIOPATHIC SCOLIOSIS, UNSPECIFIED SPINAL REGION: ICD-10-CM

## 2018-10-18 DIAGNOSIS — M54.2 NECK PAIN: ICD-10-CM

## 2018-10-18 DIAGNOSIS — D86.0 SARCOIDOSIS, LUNG (HCC): ICD-10-CM

## 2018-10-18 DIAGNOSIS — M43.16 SPONDYLOLISTHESIS OF LUMBAR REGION: ICD-10-CM

## 2018-10-18 PROCEDURE — 99203 OFFICE O/P NEW LOW 30 MIN: CPT | Performed by: INTERNAL MEDICINE

## 2018-10-18 RX ORDER — TIZANIDINE 4 MG/1
4 TABLET ORAL
Status: ON HOLD | COMMUNITY
End: 2019-02-18

## 2018-10-18 RX ORDER — LEVETIRACETAM 500 MG/1
1000 TABLET ORAL 2 TIMES DAILY
COMMUNITY

## 2018-10-18 RX ORDER — BUPRENORPHINE 10 UG/H
PATCH, EXTENDED RELEASE TRANSDERMAL
Refills: 0 | Status: ON HOLD | COMMUNITY
Start: 2018-10-10 | End: 2019-02-13

## 2018-10-19 NOTE — PROGRESS NOTES
CC: Anabella Neves is a 58 y.o. female is suffering from   Chief Complaint   Patient presents with   • Establish Care   • Osteoporosis         SUBJECTIVE:  1. Establishing care with new doctor, encounter for  Denise is here for establishment of care request of Dr. QIAN Carter, there is a concern about osteoporosis and is for surgery.    2. Sarcoidosis, lung (HCC)  Patient had discussed that she suffers from sarcoidosis of the lungs, apparently spent 1 year being treated with prednisone.    3. Lumbar and sacral spondylarthritis  Patient was significant issues associated with arthritis associate with her low back, pending surgery    4. Spondylolisthesis of lumbar region  Patient was previous lumbar fusion clinically stable concerns her DEXA scan interpreting this area.    5. Neck pain  Neck pain which is ongoing    6. Other idiopathic scoliosis, unspecified spinal region  Long-standing in nature    7. Encounter for vitamin deficiency screening  Orders written check vitamin D    8. Dyslipidemia  Recheck cholesterol    9. Screening for thyroid disorder  Recheck thyroid function        Past social, family, history:   Social History   Substance Use Topics   • Smoking status: Never Smoker   • Smokeless tobacco: Never Used   • Alcohol use No         MEDICATIONS:    Current Outpatient Prescriptions:   •  tizanidine (ZANAFLEX) 4 MG Tab, Take 4 mg by mouth every 6 hours as needed., Disp: , Rfl:   •  levETIRAcetam (KEPPRA) 500 MG Tab, Take 1,000 mg by mouth 2 times a day., Disp: , Rfl:   •  BUTRANS 10 MCG/HR PATCH WEEKLY, *M96.1*APPLY 1 PATCH(ES) EVERY WEEK BY TRANSDERMAL ROUTE AS DIRECTED FOR 28 DAYS., Disp: , Rfl: 0  •  gabapentin (NEURONTIN) 600 MG tablet, Take 600 mg by mouth 4 times a day., Disp: , Rfl:   •  oxycodone-acetaminophen (PERCOCET-10)  MG Tab, Take 1-2 Tabs by mouth every four hours as needed for Moderate Pain. (Patient not taking: Reported on 10/18/2018), Disp: 80 Tab, Rfl: 0  •   "hydrochlorothiazide (HYDRODIURIL) 25 MG Tab, Take 25 mg by mouth 2 times a day as needed., Disp: , Rfl:   •  metoprolol SR (TOPROL XL) 50 MG TABLET SR 24 HR, Take 50 mg by mouth every day., Disp: , Rfl:     PROBLEMS:  Patient Active Problem List    Diagnosis Date Noted   • Lumbar radiculopathy 02/20/2017   • Spondylolisthesis of lumbar region 09/07/2016   • Sarcoidosis, lung (HCC) 05/12/2016   • Obstructive sleep apnea 05/12/2016   • Lumbar and sacral spondylarthritis 05/12/2016       REVIEW OF SYSTEMS:  Gen.:  No Nausea, Vomiting, fever, Chills.  Heart: No chest pain.  Lungs:  No shortness of Breath.  Psychological: Hamlet unusual Anxiety depression     PHYSICAL EXAM   Constitutional: Alert, cooperative, not in acute distress.  Cardiovascular:  Rate Rhythm is regular without murmurs rubs clicks.     Thorax & Lungs: Clear to auscultation, no wheezing, rhonchi, or rales  HENT: Normocephalic, Atraumatic.  Eyes: PERRLA, EOMI, Conjunctiva normal.   Neck: Trachia is midline no swelling of the thyroid.   Lymphatic: No lymphadenopathy noted.   Abdomin: Soft non-tender, no rebound, no guarding.   Skin: Warm, Dry, No erythema, No rash.   Extremities: Atraumatic with symmetric distal pulses, No edema, No tenderness, No cyanosis, No clubbing.   Musculoskeletal: Patient with pain to palpation right sacroiliac joint also right trochanteric bursa I recommended an injection  Neurologic: Alert & oriented x 3, cranial nerves II through XII are intact, Normal motor function, Normal sensory function, No focal deficits noted.   Psychiatric: Affect normal, Judgment normal, Mood normal.     VITAL SIGNS:/106   Pulse 66   Temp 37.2 °C (98.9 °F) (Temporal)   Resp 16   Ht 1.676 m (5' 6\")   Wt 74.4 kg (164 lb)   LMP 06/01/2007   SpO2 100%   BMI 26.47 kg/m²     Labs: Reviewed    Assessment:                                                     Plan:    1. Establishing care with new doctor, encounter for  Patient is medically stable " at this point    2. 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 Lumbar and sacral spondylarthritis  X-rays ordered thoracic spine because of the concern about possible compression fractures  - DX-THORACIC SPINE-2 VIEWS; Future    4. Spondylolisthesis of lumbar region  Stable    5. Neck pain  Stable    6. Other idiopathic scoliosis, unspecified spinal region  X-rays ordered  - DX-THORACIC SPINE-2 VIEWS; Future    7. Encounter for vitamin deficiency screening  Check vitamin D  - VITAMIN D,25 HYDROXY; Future    8. Dyslipidemia  Recheck lipid profile  - LIPID PROFILE; Future    9. Screening for thyroid disorder  Labs ordered  - FREE THYROXINE; Future  - TSH; Future  - COMP METABOLIC PANEL; Future  - CBC WITH DIFFERENTIAL; Future

## 2018-10-29 ENCOUNTER — TELEPHONE (OUTPATIENT)
Dept: MEDICAL GROUP | Facility: LAB | Age: 58
End: 2018-10-29

## 2018-10-29 NOTE — TELEPHONE ENCOUNTER
1. Caller Name: Anabella                                         Call Back Number: [phone] (home)       Patient approves a detailed voicemail message: yes    Patient called stating she had found the imaging report. Spondylolisthesis of lumbar region she would like a call back. To discuss the imaging report she got from [facility].

## 2018-11-01 NOTE — TELEPHONE ENCOUNTER
Telephone call returned to the patient and asked her to complete the x-ray of the thorax.  Apparently previous study done through [facility] but I do not have the films I really need to see what the patient is suffering from

## 2018-11-06 ENCOUNTER — TELEPHONE (OUTPATIENT)
Dept: MEDICAL GROUP | Facility: LAB | Age: 58
End: 2018-11-06

## 2018-11-06 ENCOUNTER — HOSPITAL ENCOUNTER (OUTPATIENT)
Dept: RADIOLOGY | Facility: MEDICAL CENTER | Age: 58
End: 2018-11-06
Attending: INTERNAL MEDICINE
Payer: COMMERCIAL

## 2018-11-06 ENCOUNTER — HOSPITAL ENCOUNTER (OUTPATIENT)
Dept: LAB | Facility: MEDICAL CENTER | Age: 58
End: 2018-11-06
Attending: INTERNAL MEDICINE
Payer: COMMERCIAL

## 2018-11-06 DIAGNOSIS — Z13.21 ENCOUNTER FOR VITAMIN DEFICIENCY SCREENING: ICD-10-CM

## 2018-11-06 DIAGNOSIS — M47.817 LUMBAR AND SACRAL SPONDYLARTHRITIS: ICD-10-CM

## 2018-11-06 DIAGNOSIS — M41.20 OTHER IDIOPATHIC SCOLIOSIS, UNSPECIFIED SPINAL REGION: ICD-10-CM

## 2018-11-06 DIAGNOSIS — E78.5 DYSLIPIDEMIA: ICD-10-CM

## 2018-11-06 DIAGNOSIS — Z13.29 SCREENING FOR THYROID DISORDER: ICD-10-CM

## 2018-11-06 LAB
25(OH)D3 SERPL-MCNC: 31 NG/ML (ref 30–100)
ALBUMIN SERPL BCP-MCNC: 5 G/DL (ref 3.2–4.9)
ALBUMIN/GLOB SERPL: 2.1 G/DL
ALP SERPL-CCNC: 83 U/L (ref 30–99)
ALT SERPL-CCNC: 10 U/L (ref 2–50)
ANION GAP SERPL CALC-SCNC: 9 MMOL/L (ref 0–11.9)
AST SERPL-CCNC: 17 U/L (ref 12–45)
BASOPHILS # BLD AUTO: 0.4 % (ref 0–1.8)
BASOPHILS # BLD: 0.02 K/UL (ref 0–0.12)
BILIRUB SERPL-MCNC: 0.9 MG/DL (ref 0.1–1.5)
BUN SERPL-MCNC: 15 MG/DL (ref 8–22)
CALCIUM SERPL-MCNC: 9.9 MG/DL (ref 8.5–10.5)
CHLORIDE SERPL-SCNC: 104 MMOL/L (ref 96–112)
CHOLEST SERPL-MCNC: 264 MG/DL (ref 100–199)
CO2 SERPL-SCNC: 27 MMOL/L (ref 20–33)
CREAT SERPL-MCNC: 0.73 MG/DL (ref 0.5–1.4)
EOSINOPHIL # BLD AUTO: 0.06 K/UL (ref 0–0.51)
EOSINOPHIL NFR BLD: 1.1 % (ref 0–6.9)
ERYTHROCYTE [DISTWIDTH] IN BLOOD BY AUTOMATED COUNT: 43.9 FL (ref 35.9–50)
FASTING STATUS PATIENT QL REPORTED: NORMAL
GLOBULIN SER CALC-MCNC: 2.4 G/DL (ref 1.9–3.5)
GLUCOSE SERPL-MCNC: 84 MG/DL (ref 65–99)
HCT VFR BLD AUTO: 43.4 % (ref 37–47)
HDLC SERPL-MCNC: 74 MG/DL
HGB BLD-MCNC: 14.7 G/DL (ref 12–16)
IMM GRANULOCYTES # BLD AUTO: 0.01 K/UL (ref 0–0.11)
IMM GRANULOCYTES NFR BLD AUTO: 0.2 % (ref 0–0.9)
LDLC SERPL CALC-MCNC: 177 MG/DL
LYMPHOCYTES # BLD AUTO: 1.05 K/UL (ref 1–4.8)
LYMPHOCYTES NFR BLD: 18.6 % (ref 22–41)
MCH RBC QN AUTO: 33 PG (ref 27–33)
MCHC RBC AUTO-ENTMCNC: 33.9 G/DL (ref 33.6–35)
MCV RBC AUTO: 97.5 FL (ref 81.4–97.8)
MONOCYTES # BLD AUTO: 0.36 K/UL (ref 0–0.85)
MONOCYTES NFR BLD AUTO: 6.4 % (ref 0–13.4)
NEUTROPHILS # BLD AUTO: 4.15 K/UL (ref 2–7.15)
NEUTROPHILS NFR BLD: 73.3 % (ref 44–72)
NRBC # BLD AUTO: 0 K/UL
NRBC BLD-RTO: 0 /100 WBC
PLATELET # BLD AUTO: 227 K/UL (ref 164–446)
PMV BLD AUTO: 9 FL (ref 9–12.9)
POTASSIUM SERPL-SCNC: 3.7 MMOL/L (ref 3.6–5.5)
PROT SERPL-MCNC: 7.4 G/DL (ref 6–8.2)
RBC # BLD AUTO: 4.45 M/UL (ref 4.2–5.4)
SODIUM SERPL-SCNC: 140 MMOL/L (ref 135–145)
T4 FREE SERPL-MCNC: 0.9 NG/DL (ref 0.53–1.43)
TRIGL SERPL-MCNC: 66 MG/DL (ref 0–149)
TSH SERPL DL<=0.005 MIU/L-ACNC: 1.9 UIU/ML (ref 0.38–5.33)
WBC # BLD AUTO: 5.7 K/UL (ref 4.8–10.8)

## 2018-11-06 PROCEDURE — 84439 ASSAY OF FREE THYROXINE: CPT

## 2018-11-06 PROCEDURE — 84443 ASSAY THYROID STIM HORMONE: CPT

## 2018-11-06 PROCEDURE — 85025 COMPLETE CBC W/AUTO DIFF WBC: CPT

## 2018-11-06 PROCEDURE — 82306 VITAMIN D 25 HYDROXY: CPT

## 2018-11-06 PROCEDURE — 80061 LIPID PANEL: CPT

## 2018-11-06 PROCEDURE — 72070 X-RAY EXAM THORAC SPINE 2VWS: CPT

## 2018-11-06 PROCEDURE — 36415 COLL VENOUS BLD VENIPUNCTURE: CPT

## 2018-11-06 PROCEDURE — 80053 COMPREHEN METABOLIC PANEL: CPT

## 2018-11-06 NOTE — TELEPHONE ENCOUNTER
Phone Number Called: [phone] (home)     Message: I left a voicemail to please call us back regarding your x-ray results from 11/5/18. Per Dr. Anne requested for me to mail her the x-ray results/letter.    Left Message for patient to call back: yes

## 2018-11-08 ENCOUNTER — OFFICE VISIT (OUTPATIENT)
Dept: MEDICAL GROUP | Facility: LAB | Age: 58
End: 2018-11-08
Payer: COMMERCIAL

## 2018-11-08 VITALS
HEIGHT: 66 IN | SYSTOLIC BLOOD PRESSURE: 116 MMHG | TEMPERATURE: 97.3 F | HEART RATE: 67 BPM | BODY MASS INDEX: 26.36 KG/M2 | WEIGHT: 164 LBS | DIASTOLIC BLOOD PRESSURE: 72 MMHG | RESPIRATION RATE: 12 BRPM | OXYGEN SATURATION: 95 %

## 2018-11-08 DIAGNOSIS — D86.0 SARCOIDOSIS, LUNG (HCC): ICD-10-CM

## 2018-11-08 DIAGNOSIS — R93.89 ABNORMAL X-RAY: ICD-10-CM

## 2018-11-08 DIAGNOSIS — E78.5 DYSLIPIDEMIA: ICD-10-CM

## 2018-11-08 PROCEDURE — 99214 OFFICE O/P EST MOD 30 MIN: CPT | Performed by: INTERNAL MEDICINE

## 2018-11-08 RX ORDER — METOPROLOL SUCCINATE 50 MG/1
50 TABLET, EXTENDED RELEASE ORAL DAILY
Qty: 30 TAB | Refills: 5 | Status: SHIPPED | OUTPATIENT
Start: 2018-11-08 | End: 2018-12-06

## 2018-11-08 NOTE — LETTER
[facility]  Curtis Anne D.O.  [address]  [place] [address]  Fax: [phone]   Authorization for Release/Disclosure of   Protected Health Information   Name: JOSE D NEVES : 1960 SSN: [id number]   Address: [address] [place] [address] [address]  [place] [address] Phone:    [phone] (home)    I authorize the entity listed below to release/disclose the PHI below to:   [facility]/Curtis Anne D.O. and Curtis Anne D.O.   Provider or Entity Name:  Shawn Carter MD   Address   City, State, Zip  [place] Phone:      Fax:     Reason for request: continuity of care   Information to be released:    [  ] LAST COLONOSCOPY,  including any PATH REPORT and follow-up  [  ] LAST FIT/COLOGUARD RESULT [  ] LAST DEXA  [  ] LAST MAMMOGRAM  [  ] LAST PAP  [  ] LAST LABS [  ] RETINA EXAM REPORT  [  ] IMMUNIZATION RECORDS  [ x ] Release all info      [  ] Check here and initial the line next to each item to release ALL health information INCLUDING  _____ Care and treatment for drug and / or alcohol abuse  _____ HIV testing, infection status, or AIDS  _____ Genetic Testing    DATES OF SERVICE OR TIME PERIOD TO BE DISCLOSED: _____________  I understand and acknowledge that:  * This Authorization may be revoked at any time by you in writing, except if your health information has already been used or disclosed.  * Your health information that will be used or disclosed as a result of you signing this authorization could be re-disclosed by the recipient. If this occurs, your re-disclosed health information may no longer be protected by State or Federal laws.  * You may refuse to sign this Authorization. Your refusal will not affect your ability to obtain treatment.  * This Authorization becomes effective upon signing and will  on (date) __________.      If no date is indicated, this Authorization will  one (1) year from the signature date.    Name: Jose D Neves    Signature:   Date:        11/8/2018       PLEASE FAX REQUESTED RECORDS BACK TO: [phone]

## 2018-11-08 NOTE — LETTER
November 8, 2018        Patient: Anabella Neves   YOB: 1960   Date of Visit: 11/8/2018     Dr. Shawn Carter  [facility]      Dear Shawn:    It is my opinion that Anabella Neves has completed her DEXA scan which shows osteopenia, not osteoporosis.  Additionally I have reviewed her repeat x-ray of the thoracic spine, there was no mention of compression fractures at T11-T12.  I have received a copy of her x-rays from [facility]'s there is a conflict.  At this juncture I see no reason not to proceed from a medical standpoint with lumbar surgery as may be indicated.     If you have any questions or concerns, please don't hesitate to call [phone].        Sincerely,        Sam Anne D.O.  Board Certified General Internal Medicine.      [facility]  [address] [id number] [phone] (Phone)  [phone] (Fax)

## 2018-11-09 ENCOUNTER — TELEPHONE (OUTPATIENT)
Dept: MEDICAL GROUP | Facility: LAB | Age: 58
End: 2018-11-09

## 2018-11-09 NOTE — TELEPHONE ENCOUNTER
Phone Number Called: [phone] (home)     Message: I left a voicemail to please call us back regarding your Dexa results. Per Dr. Anne requested to mail results/letter.     Left Message for patient to call back: yes

## 2018-11-09 NOTE — PROGRESS NOTES
CC: Anabella Neves is a 58 y.o. female is suffering from   Chief Complaint   Patient presents with   • Follow-Up     3 weeks         SUBJECTIVE:  1. Dyslipidemia  Sofia is here for follow-up suffers from dyslipidemia recommended CT cardiac scoring    2. Sarcoidosis, lung (HCC)  Patient suffers from sarcoidosis of her lungs, apparently she had a cervical spinal tumor diagnosed in 2004 by Dr. Sandy Fatima.  This was resected at the [facility]    3. Abnormal x-ray  Patient with a history of abnormal x-rays of her lumbar spine, we have also discussed her DEXA scan.  Patient is undergone evaluations by Dr. Ordoñez, Dr. Kentrell Nelson, Dr. QIAN Carter, all 3 are in agreement that the patient needs surgery.        Past social, family, history:   Social History   Substance Use Topics   • Smoking status: Never Smoker   • Smokeless tobacco: Never Used   • Alcohol use No         MEDICATIONS:    Current Outpatient Prescriptions:   •  metoprolol SR (TOPROL XL) 50 MG TABLET SR 24 HR, Take 1 Tab by mouth every day., Disp: 30 Tab, Rfl: 5  •  tizanidine (ZANAFLEX) 4 MG Tab, Take 4 mg by mouth every 6 hours as needed., Disp: , Rfl:   •  levETIRAcetam (KEPPRA) 500 MG Tab, Take 1,000 mg by mouth 2 times a day., Disp: , Rfl:   •  BUTRANS 10 MCG/HR PATCH WEEKLY, *M96.1*APPLY 1 PATCH(ES) EVERY WEEK BY TRANSDERMAL ROUTE AS DIRECTED FOR 28 DAYS., Disp: , Rfl: 0  •  gabapentin (NEURONTIN) 600 MG tablet, Take 600 mg by mouth 4 times a day., Disp: , Rfl:   •  oxycodone-acetaminophen (PERCOCET-10)  MG Tab, Take 1-2 Tabs by mouth every four hours as needed for Moderate Pain. (Patient not taking: Reported on 10/18/2018), Disp: 80 Tab, Rfl: 0  •  hydrochlorothiazide (HYDRODIURIL) 25 MG Tab, Take 25 mg by mouth 2 times a day as needed., Disp: , Rfl:     PROBLEMS:  Patient Active Problem List    Diagnosis Date Noted   • Dyslipidemia 11/08/2018   • Lumbar radiculopathy 02/20/2017   •  "Spondylolisthesis of lumbar region 09/07/2016   • Sarcoidosis, lung (HCC) 05/12/2016   • Obstructive sleep apnea 05/12/2016   • Lumbar and sacral spondylarthritis 05/12/2016       REVIEW OF SYSTEMS:  Gen.:  No Nausea, Vomiting, fever, Chills.  Heart: No chest pain.  Lungs:  No shortness of Breath.  Psychological: Hamlet unusual Anxiety depression     PHYSICAL EXAM   Constitutional: Alert, cooperative, not in acute distress.  Cardiovascular:  Rate Rhythm is regular without murmurs rubs clicks.     Thorax & Lungs: Clear to auscultation, no wheezing, rhonchi, or rales  HENT: Normocephalic, Atraumatic.  Eyes: PERRLA, EOMI, Conjunctiva normal.   Neck: Trachia is midline no swelling of the thyroid.   Lymphatic: No lymphadenopathy noted.   Musculoskeletal: Pain to palpation lower lumbar spine right hand side  Neurologic: Alert & oriented x 3, cranial nerves II through XII are intact, Normal motor function, Normal sensory function, No focal deficits noted.   Psychiatric: Affect normal, Judgment normal, Mood normal.     VITAL SIGNS:/72   Pulse 67   Temp 36.3 °C (97.3 °F) (Temporal)   Resp 12   Ht 1.676 m (5' 6\")   Wt 74.4 kg (164 lb)   LMP 06/01/2007   SpO2 95%   BMI 26.47 kg/m²     Labs: Reviewed    Assessment:                                                     Plan:    1. Dyslipidemia  CT cardiac scoring ordered  - CT-CARDIAC SCORING; Future    2. Sarcoidosis, lung (HCC)  Sarcoidosis which is neuro questionably lung    3. Abnormal x-ray  X-ray ordered of the shoulder because of the discovery on x-ray of the chest of possible sclerotic lesion.  - DX-SHOULDER 2+ RIGHT; Future    Medical release for surgery dictated    "

## 2018-11-14 ENCOUNTER — TELEPHONE (OUTPATIENT)
Dept: MEDICAL GROUP | Facility: LAB | Age: 58
End: 2018-11-14

## 2018-11-14 NOTE — TELEPHONE ENCOUNTER
1. Caller Name: Anabella Trish Milton                                         Call Back Number: [phone] (home)       Patient approves a detailed voicemail message: N\A    Pt states that page 8 there is a medical clearance needs to be faxed to her  and to Dr Carter so he can schedule he appointment. She gave you the number. She was not at home if you need it please feel free to call her back.

## 2018-11-21 ENCOUNTER — TELEPHONE (OUTPATIENT)
Dept: MEDICAL GROUP | Facility: LAB | Age: 58
End: 2018-11-21

## 2018-11-21 NOTE — TELEPHONE ENCOUNTER
Dr Carter's office calling stating they never received the medical clearance. I faxed notes to Surgery scheduling [phone] as requested.

## 2018-12-06 ENCOUNTER — TELEPHONE (OUTPATIENT)
Dept: MEDICAL GROUP | Facility: LAB | Age: 58
End: 2018-12-06

## 2018-12-06 DIAGNOSIS — I15.9 SECONDARY HYPERTENSION: ICD-10-CM

## 2019-01-03 ENCOUNTER — HOSPITAL ENCOUNTER (OUTPATIENT)
Dept: RADIOLOGY | Facility: MEDICAL CENTER | Age: 59
DRG: 455 | End: 2019-01-03
Attending: NEUROLOGICAL SURGERY
Payer: COMMERCIAL

## 2019-01-03 DIAGNOSIS — Z01.812 PRE-OPERATIVE LABORATORY EXAMINATION: ICD-10-CM

## 2019-01-03 DIAGNOSIS — Z01.810 PRE-OPERATIVE CARDIOVASCULAR EXAMINATION: ICD-10-CM

## 2019-01-03 DIAGNOSIS — Z01.811 PRE-OPERATIVE RESPIRATORY EXAMINATION: ICD-10-CM

## 2019-01-03 LAB
ANION GAP SERPL CALC-SCNC: 7 MMOL/L (ref 0–11.9)
APPEARANCE UR: CLEAR
APTT PPP: 29.9 SEC (ref 24.7–36)
BASOPHILS # BLD AUTO: 0.9 % (ref 0–1.8)
BASOPHILS # BLD: 0.04 K/UL (ref 0–0.12)
BILIRUB UR QL STRIP.AUTO: NEGATIVE
BUN SERPL-MCNC: 17 MG/DL (ref 8–22)
CALCIUM SERPL-MCNC: 9.7 MG/DL (ref 8.5–10.5)
CHLORIDE SERPL-SCNC: 102 MMOL/L (ref 96–112)
CO2 SERPL-SCNC: 28 MMOL/L (ref 20–33)
COLOR UR: YELLOW
CREAT SERPL-MCNC: 0.73 MG/DL (ref 0.5–1.4)
EKG IMPRESSION: NORMAL
EOSINOPHIL # BLD AUTO: 0.09 K/UL (ref 0–0.51)
EOSINOPHIL NFR BLD: 2 % (ref 0–6.9)
ERYTHROCYTE [DISTWIDTH] IN BLOOD BY AUTOMATED COUNT: 41.6 FL (ref 35.9–50)
GLUCOSE SERPL-MCNC: 94 MG/DL (ref 65–99)
GLUCOSE UR STRIP.AUTO-MCNC: NEGATIVE MG/DL
HCT VFR BLD AUTO: 38.5 % (ref 37–47)
HGB BLD-MCNC: 13.3 G/DL (ref 12–16)
IMM GRANULOCYTES # BLD AUTO: 0.01 K/UL (ref 0–0.11)
IMM GRANULOCYTES NFR BLD AUTO: 0.2 % (ref 0–0.9)
INR PPP: 0.98 (ref 0.87–1.13)
KETONES UR STRIP.AUTO-MCNC: NEGATIVE MG/DL
LEUKOCYTE ESTERASE UR QL STRIP.AUTO: NEGATIVE
LYMPHOCYTES # BLD AUTO: 1.29 K/UL (ref 1–4.8)
LYMPHOCYTES NFR BLD: 28.6 % (ref 22–41)
MCH RBC QN AUTO: 33 PG (ref 27–33)
MCHC RBC AUTO-ENTMCNC: 34.5 G/DL (ref 33.6–35)
MCV RBC AUTO: 95.5 FL (ref 81.4–97.8)
MICRO URNS: NORMAL
MONOCYTES # BLD AUTO: 0.25 K/UL (ref 0–0.85)
MONOCYTES NFR BLD AUTO: 5.5 % (ref 0–13.4)
NEUTROPHILS # BLD AUTO: 2.83 K/UL (ref 2–7.15)
NEUTROPHILS NFR BLD: 62.8 % (ref 44–72)
NITRITE UR QL STRIP.AUTO: NEGATIVE
NRBC # BLD AUTO: 0 K/UL
NRBC BLD-RTO: 0 /100 WBC
PH UR STRIP.AUTO: 6.5 [PH]
PLATELET # BLD AUTO: 213 K/UL (ref 164–446)
PMV BLD AUTO: 9.1 FL (ref 9–12.9)
POTASSIUM SERPL-SCNC: 4 MMOL/L (ref 3.6–5.5)
PROT UR QL STRIP: NEGATIVE MG/DL
PROTHROMBIN TIME: 13.1 SEC (ref 12–14.6)
RBC # BLD AUTO: 4.03 M/UL (ref 4.2–5.4)
RBC UR QL AUTO: NEGATIVE
SODIUM SERPL-SCNC: 137 MMOL/L (ref 135–145)
SP GR UR STRIP.AUTO: 1.02
UROBILINOGEN UR STRIP.AUTO-MCNC: 1 MG/DL
WBC # BLD AUTO: 4.5 K/UL (ref 4.8–10.8)

## 2019-01-03 PROCEDURE — 93010 ELECTROCARDIOGRAM REPORT: CPT | Performed by: INTERNAL MEDICINE

## 2019-01-03 PROCEDURE — 85730 THROMBOPLASTIN TIME PARTIAL: CPT

## 2019-01-03 PROCEDURE — 85025 COMPLETE CBC W/AUTO DIFF WBC: CPT

## 2019-01-03 PROCEDURE — 85610 PROTHROMBIN TIME: CPT

## 2019-01-03 PROCEDURE — 93005 ELECTROCARDIOGRAM TRACING: CPT

## 2019-01-03 PROCEDURE — 81003 URINALYSIS AUTO W/O SCOPE: CPT

## 2019-01-03 PROCEDURE — 71046 X-RAY EXAM CHEST 2 VIEWS: CPT

## 2019-01-03 PROCEDURE — 36415 COLL VENOUS BLD VENIPUNCTURE: CPT

## 2019-01-03 PROCEDURE — 80048 BASIC METABOLIC PNL TOTAL CA: CPT

## 2019-01-03 RX ORDER — HYDROCODONE BITARTRATE AND ACETAMINOPHEN 10; 325 MG/1; MG/1
1-2 TABLET ORAL EVERY 8 HOURS PRN
Status: ON HOLD | COMMUNITY
End: 2019-02-18

## 2019-02-01 DIAGNOSIS — Z01.811 PRE-OPERATIVE RESPIRATORY EXAMINATION: ICD-10-CM

## 2019-02-01 DIAGNOSIS — Z01.812 PRE-OPERATIVE LABORATORY EXAMINATION: ICD-10-CM

## 2019-02-01 DIAGNOSIS — Z01.810 PRE-OPERATIVE CARDIOVASCULAR EXAMINATION: ICD-10-CM

## 2019-02-01 LAB
ANION GAP SERPL CALC-SCNC: 5 MMOL/L (ref 0–11.9)
APPEARANCE UR: CLEAR
APTT PPP: 28.1 SEC (ref 24.7–36)
BASOPHILS # BLD AUTO: 0.9 % (ref 0–1.8)
BASOPHILS # BLD: 0.04 K/UL (ref 0–0.12)
BILIRUB UR QL STRIP.AUTO: NEGATIVE
BUN SERPL-MCNC: 19 MG/DL (ref 8–22)
CALCIUM SERPL-MCNC: 9.8 MG/DL (ref 8.5–10.5)
CHLORIDE SERPL-SCNC: 106 MMOL/L (ref 96–112)
CO2 SERPL-SCNC: 28 MMOL/L (ref 20–33)
COLOR UR: YELLOW
CREAT SERPL-MCNC: 0.79 MG/DL (ref 0.5–1.4)
EOSINOPHIL # BLD AUTO: 0.14 K/UL (ref 0–0.51)
EOSINOPHIL NFR BLD: 3 % (ref 0–6.9)
ERYTHROCYTE [DISTWIDTH] IN BLOOD BY AUTOMATED COUNT: 42.9 FL (ref 35.9–50)
GLUCOSE SERPL-MCNC: 79 MG/DL (ref 65–99)
GLUCOSE UR STRIP.AUTO-MCNC: NEGATIVE MG/DL
HCT VFR BLD AUTO: 39.7 % (ref 37–47)
HGB BLD-MCNC: 13.5 G/DL (ref 12–16)
IMM GRANULOCYTES # BLD AUTO: 0.01 K/UL (ref 0–0.11)
IMM GRANULOCYTES NFR BLD AUTO: 0.2 % (ref 0–0.9)
INR PPP: 0.99 (ref 0.87–1.13)
KETONES UR STRIP.AUTO-MCNC: NEGATIVE MG/DL
LEUKOCYTE ESTERASE UR QL STRIP.AUTO: NEGATIVE
LYMPHOCYTES # BLD AUTO: 1.4 K/UL (ref 1–4.8)
LYMPHOCYTES NFR BLD: 30.4 % (ref 22–41)
MCH RBC QN AUTO: 33.1 PG (ref 27–33)
MCHC RBC AUTO-ENTMCNC: 34 G/DL (ref 33.6–35)
MCV RBC AUTO: 97.3 FL (ref 81.4–97.8)
MICRO URNS: NORMAL
MONOCYTES # BLD AUTO: 0.26 K/UL (ref 0–0.85)
MONOCYTES NFR BLD AUTO: 5.6 % (ref 0–13.4)
NEUTROPHILS # BLD AUTO: 2.76 K/UL (ref 2–7.15)
NEUTROPHILS NFR BLD: 59.9 % (ref 44–72)
NITRITE UR QL STRIP.AUTO: NEGATIVE
NRBC # BLD AUTO: 0 K/UL
NRBC BLD-RTO: 0 /100 WBC
PH UR STRIP.AUTO: 5 [PH]
PLATELET # BLD AUTO: 210 K/UL (ref 164–446)
PMV BLD AUTO: 9 FL (ref 9–12.9)
POTASSIUM SERPL-SCNC: 4.3 MMOL/L (ref 3.6–5.5)
PROT UR QL STRIP: NEGATIVE MG/DL
PROTHROMBIN TIME: 13.2 SEC (ref 12–14.6)
RBC # BLD AUTO: 4.08 M/UL (ref 4.2–5.4)
RBC UR QL AUTO: NEGATIVE
SODIUM SERPL-SCNC: 139 MMOL/L (ref 135–145)
SP GR UR STRIP.AUTO: 1.02
UROBILINOGEN UR STRIP.AUTO-MCNC: 0.2 MG/DL
WBC # BLD AUTO: 4.6 K/UL (ref 4.8–10.8)

## 2019-02-01 PROCEDURE — 85025 COMPLETE CBC W/AUTO DIFF WBC: CPT

## 2019-02-01 PROCEDURE — 81003 URINALYSIS AUTO W/O SCOPE: CPT

## 2019-02-01 PROCEDURE — 36415 COLL VENOUS BLD VENIPUNCTURE: CPT

## 2019-02-01 PROCEDURE — 85610 PROTHROMBIN TIME: CPT

## 2019-02-01 PROCEDURE — 85730 THROMBOPLASTIN TIME PARTIAL: CPT

## 2019-02-01 PROCEDURE — 80048 BASIC METABOLIC PNL TOTAL CA: CPT

## 2019-02-13 ENCOUNTER — APPOINTMENT (OUTPATIENT)
Dept: RADIOLOGY | Facility: MEDICAL CENTER | Age: 59
DRG: 455 | End: 2019-02-13
Attending: NEUROLOGICAL SURGERY
Payer: COMMERCIAL

## 2019-02-13 ENCOUNTER — HOSPITAL ENCOUNTER (INPATIENT)
Facility: MEDICAL CENTER | Age: 59
LOS: 5 days | DRG: 455 | End: 2019-02-18
Attending: NEUROLOGICAL SURGERY | Admitting: NEUROLOGICAL SURGERY
Payer: COMMERCIAL

## 2019-02-13 DIAGNOSIS — M54.16 LUMBAR RADICULOPATHY: ICD-10-CM

## 2019-02-13 PROCEDURE — A9270 NON-COVERED ITEM OR SERVICE: HCPCS | Performed by: NURSE PRACTITIONER

## 2019-02-13 PROCEDURE — 700101 HCHG RX REV CODE 250: Performed by: NURSE PRACTITIONER

## 2019-02-13 PROCEDURE — 700111 HCHG RX REV CODE 636 W/ 250 OVERRIDE (IP)

## 2019-02-13 PROCEDURE — 700112 HCHG RX REV CODE 229: Performed by: NURSE PRACTITIONER

## 2019-02-13 PROCEDURE — C1713 ANCHOR/SCREW BN/BN,TIS/BN: HCPCS | Performed by: NEUROLOGICAL SURGERY

## 2019-02-13 PROCEDURE — 700111 HCHG RX REV CODE 636 W/ 250 OVERRIDE (IP): Performed by: NURSE PRACTITIONER

## 2019-02-13 PROCEDURE — 501838 HCHG SUTURE GENERAL: Performed by: NEUROLOGICAL SURGERY

## 2019-02-13 PROCEDURE — 500367 HCHG DRAIN KIT, HEMOVAC: Performed by: NEUROLOGICAL SURGERY

## 2019-02-13 PROCEDURE — 700111 HCHG RX REV CODE 636 W/ 250 OVERRIDE (IP): Performed by: ANESTHESIOLOGY

## 2019-02-13 PROCEDURE — 160042 HCHG SURGERY MINUTES - EA ADDL 1 MIN LEVEL 5: Performed by: NEUROLOGICAL SURGERY

## 2019-02-13 PROCEDURE — 700111 HCHG RX REV CODE 636 W/ 250 OVERRIDE (IP): Performed by: NEUROLOGICAL SURGERY

## 2019-02-13 PROCEDURE — 95937 NEUROMUSCULAR JUNCTION TEST: CPT | Performed by: NEUROLOGICAL SURGERY

## 2019-02-13 PROCEDURE — 95861 NEEDLE EMG 2 EXTREMITIES: CPT | Performed by: NEUROLOGICAL SURGERY

## 2019-02-13 PROCEDURE — 160009 HCHG ANES TIME/MIN: Performed by: NEUROLOGICAL SURGERY

## 2019-02-13 PROCEDURE — 160035 HCHG PACU - 1ST 60 MINS PHASE I: Performed by: NEUROLOGICAL SURGERY

## 2019-02-13 PROCEDURE — 4A11X4G MONITORING OF PERIPHERAL NERVOUS ELECTRICAL ACTIVITY, INTRAOPERATIVE, EXTERNAL APPROACH: ICD-10-PCS | Performed by: NEUROLOGICAL SURGERY

## 2019-02-13 PROCEDURE — 110372 HCHG SHELL REV 278: Performed by: NEUROLOGICAL SURGERY

## 2019-02-13 PROCEDURE — 700102 HCHG RX REV CODE 250 W/ 637 OVERRIDE(OP): Performed by: NURSE PRACTITIONER

## 2019-02-13 PROCEDURE — 0SB20ZZ EXCISION OF LUMBAR VERTEBRAL DISC, OPEN APPROACH: ICD-10-PCS | Performed by: NEUROLOGICAL SURGERY

## 2019-02-13 PROCEDURE — 160036 HCHG PACU - EA ADDL 30 MINS PHASE I: Performed by: NEUROLOGICAL SURGERY

## 2019-02-13 PROCEDURE — 700102 HCHG RX REV CODE 250 W/ 637 OVERRIDE(OP): Performed by: ANESTHESIOLOGY

## 2019-02-13 PROCEDURE — 110454 HCHG SHELL REV 250: Performed by: NEUROLOGICAL SURGERY

## 2019-02-13 PROCEDURE — 0SG30AJ FUSION OF LUMBOSACRAL JOINT WITH INTERBODY FUSION DEVICE, POSTERIOR APPROACH, ANTERIOR COLUMN, OPEN APPROACH: ICD-10-PCS | Performed by: NEUROLOGICAL SURGERY

## 2019-02-13 PROCEDURE — 502240 HCHG MISC OR SUPPLY RC 0272: Performed by: NEUROLOGICAL SURGERY

## 2019-02-13 PROCEDURE — 72020 X-RAY EXAM OF SPINE 1 VIEW: CPT

## 2019-02-13 PROCEDURE — 160002 HCHG RECOVERY MINUTES (STAT): Performed by: NEUROLOGICAL SURGERY

## 2019-02-13 PROCEDURE — 95938 SOMATOSENSORY TESTING: CPT | Performed by: NEUROLOGICAL SURGERY

## 2019-02-13 PROCEDURE — A9270 NON-COVERED ITEM OR SERVICE: HCPCS | Performed by: ANESTHESIOLOGY

## 2019-02-13 PROCEDURE — 0SG10AJ FUSION OF 2 OR MORE LUMBAR VERTEBRAL JOINTS WITH INTERBODY FUSION DEVICE, POSTERIOR APPROACH, ANTERIOR COLUMN, OPEN APPROACH: ICD-10-PCS | Performed by: NEUROLOGICAL SURGERY

## 2019-02-13 PROCEDURE — C1821 INTERSPINOUS IMPLANT: HCPCS | Performed by: NEUROLOGICAL SURGERY

## 2019-02-13 PROCEDURE — 160048 HCHG OR STATISTICAL LEVEL 1-5: Performed by: NEUROLOGICAL SURGERY

## 2019-02-13 PROCEDURE — 160031 HCHG SURGERY MINUTES - 1ST 30 MINS LEVEL 5: Performed by: NEUROLOGICAL SURGERY

## 2019-02-13 PROCEDURE — 700101 HCHG RX REV CODE 250

## 2019-02-13 PROCEDURE — 770006 HCHG ROOM/CARE - MED/SURG/GYN SEMI*

## 2019-02-13 PROCEDURE — 500885 HCHG PACK, JACKSON TABLE: Performed by: NEUROLOGICAL SURGERY

## 2019-02-13 PROCEDURE — 0SG30J1 FUSION OF LUMBOSACRAL JOINT WITH SYNTHETIC SUBSTITUTE, POSTERIOR APPROACH, POSTERIOR COLUMN, OPEN APPROACH: ICD-10-PCS | Performed by: NEUROLOGICAL SURGERY

## 2019-02-13 PROCEDURE — 110371 HCHG SHELL REV 272: Performed by: NEUROLOGICAL SURGERY

## 2019-02-13 PROCEDURE — A6402 STERILE GAUZE <= 16 SQ IN: HCPCS | Performed by: NEUROLOGICAL SURGERY

## 2019-02-13 PROCEDURE — 0SG10J1 FUSION OF 2 OR MORE LUMBAR VERTEBRAL JOINTS WITH SYNTHETIC SUBSTITUTE, POSTERIOR APPROACH, POSTERIOR COLUMN, OPEN APPROACH: ICD-10-PCS | Performed by: NEUROLOGICAL SURGERY

## 2019-02-13 PROCEDURE — 95940 IONM IN OPERATNG ROOM 15 MIN: CPT | Performed by: NEUROLOGICAL SURGERY

## 2019-02-13 PROCEDURE — 01NB0ZZ RELEASE LUMBAR NERVE, OPEN APPROACH: ICD-10-PCS | Performed by: NEUROLOGICAL SURGERY

## 2019-02-13 DEVICE — SCREW MAS  SOLERA 6.5 X 35MM (1TCX8+3TCX8=32): Type: IMPLANTABLE DEVICE | Site: SPINE LUMBAR | Status: FUNCTIONAL

## 2019-02-13 DEVICE — SCREW MAS  SOLERA 6.5 X 50MM (1TCX16+3TCX8=40): Type: IMPLANTABLE DEVICE | Site: SPINE LUMBAR | Status: FUNCTIONAL

## 2019-02-13 DEVICE — SCREW MAS  SOLERA 6.5 X 40MM (1TCX16+3TCX8=40): Type: IMPLANTABLE DEVICE | Site: SPINE LUMBAR | Status: FUNCTIONAL

## 2019-02-13 DEVICE — ROD PREBENT TITANIUM 5.5 X 110MM (2TCONX2=4): Type: IMPLANTABLE DEVICE | Site: SPINE LUMBAR | Status: FUNCTIONAL

## 2019-02-13 DEVICE — SCREW LGCY SET - (3TX30=90): Type: IMPLANTABLE DEVICE | Site: SPINE LUMBAR | Status: FUNCTIONAL

## 2019-02-13 DEVICE — BONE CHIPS CANC 4-10MM 30CC - CRUSHED  FREEZE DRIED ONLY: Type: IMPLANTABLE DEVICE | Site: SPINE LUMBAR | Status: FUNCTIONAL

## 2019-02-13 DEVICE — SCREW SOLERA SET SCREW (1TCX40+3TCX21+2TCX10=123): Type: IMPLANTABLE DEVICE | Site: SPINE LUMBAR | Status: FUNCTIONAL

## 2019-02-13 DEVICE — SCREW MAS  SOLERA 6.5 X 45MM (1TCX16+3TCX8=40): Type: IMPLANTABLE DEVICE | Site: SPINE LUMBAR | Status: FUNCTIONAL

## 2019-02-13 DEVICE — SPACER RISE 10X22MM 8-14MM 10 DEGREES (3TCONX2=6): Type: IMPLANTABLE DEVICE | Site: SPINE LUMBAR | Status: FUNCTIONAL

## 2019-02-13 RX ORDER — CELECOXIB 200 MG/1
400 CAPSULE ORAL ONCE
Status: DISCONTINUED | OUTPATIENT
Start: 2019-02-13 | End: 2019-02-13

## 2019-02-13 RX ORDER — MEPERIDINE HYDROCHLORIDE 25 MG/ML
6.25 INJECTION INTRAMUSCULAR; INTRAVENOUS; SUBCUTANEOUS
Status: DISCONTINUED | OUTPATIENT
Start: 2019-02-13 | End: 2019-02-13 | Stop reason: HOSPADM

## 2019-02-13 RX ORDER — ENEMA 19; 7 G/133ML; G/133ML
1 ENEMA RECTAL
Status: DISCONTINUED | OUTPATIENT
Start: 2019-02-13 | End: 2019-02-18 | Stop reason: HOSPADM

## 2019-02-13 RX ORDER — GABAPENTIN 300 MG/1
600 CAPSULE ORAL ONCE
Status: DISCONTINUED | OUTPATIENT
Start: 2019-02-13 | End: 2019-02-13

## 2019-02-13 RX ORDER — SODIUM CHLORIDE, SODIUM LACTATE, POTASSIUM CHLORIDE, CALCIUM CHLORIDE 600; 310; 30; 20 MG/100ML; MG/100ML; MG/100ML; MG/100ML
INJECTION, SOLUTION INTRAVENOUS ONCE
Status: COMPLETED | OUTPATIENT
Start: 2019-02-13 | End: 2019-02-13

## 2019-02-13 RX ORDER — ACETAMINOPHEN 500 MG
1000 TABLET ORAL ONCE
Status: COMPLETED | OUTPATIENT
Start: 2019-02-13 | End: 2019-02-13

## 2019-02-13 RX ORDER — OXYCODONE HCL 5 MG/5 ML
10 SOLUTION, ORAL ORAL
Status: COMPLETED | OUTPATIENT
Start: 2019-02-13 | End: 2019-02-13

## 2019-02-13 RX ORDER — POLYVINYL ALCOHOL 14 MG/ML
1 SOLUTION/ DROPS OPHTHALMIC
Status: DISCONTINUED | OUTPATIENT
Start: 2019-02-13 | End: 2019-02-18 | Stop reason: HOSPADM

## 2019-02-13 RX ORDER — ONDANSETRON 2 MG/ML
4 INJECTION INTRAMUSCULAR; INTRAVENOUS EVERY 4 HOURS PRN
Status: DISCONTINUED | OUTPATIENT
Start: 2019-02-13 | End: 2019-02-18 | Stop reason: HOSPADM

## 2019-02-13 RX ORDER — BUPRENORPHINE 10 UG/H
1 PATCH TRANSDERMAL
Status: ON HOLD | COMMUNITY
End: 2019-02-18

## 2019-02-13 RX ORDER — BUPIVACAINE HYDROCHLORIDE AND EPINEPHRINE 2.5; 5 MG/ML; UG/ML
INJECTION, SOLUTION EPIDURAL; INFILTRATION; INTRACAUDAL; PERINEURAL
Status: DISCONTINUED | OUTPATIENT
Start: 2019-02-13 | End: 2019-02-13 | Stop reason: HOSPADM

## 2019-02-13 RX ORDER — DOCUSATE SODIUM 100 MG/1
100 CAPSULE, LIQUID FILLED ORAL 2 TIMES DAILY
Status: DISCONTINUED | OUTPATIENT
Start: 2019-02-13 | End: 2019-02-18 | Stop reason: HOSPADM

## 2019-02-13 RX ORDER — HYDROMORPHONE HYDROCHLORIDE 1 MG/ML
0.2 INJECTION, SOLUTION INTRAMUSCULAR; INTRAVENOUS; SUBCUTANEOUS
Status: DISCONTINUED | OUTPATIENT
Start: 2019-02-13 | End: 2019-02-13 | Stop reason: HOSPADM

## 2019-02-13 RX ORDER — GABAPENTIN 300 MG/1
300 CAPSULE ORAL ONCE
Status: COMPLETED | OUTPATIENT
Start: 2019-02-13 | End: 2019-02-13

## 2019-02-13 RX ORDER — HYDROMORPHONE HYDROCHLORIDE 1 MG/ML
0.4 INJECTION, SOLUTION INTRAMUSCULAR; INTRAVENOUS; SUBCUTANEOUS
Status: DISCONTINUED | OUTPATIENT
Start: 2019-02-13 | End: 2019-02-13 | Stop reason: HOSPADM

## 2019-02-13 RX ORDER — DIPHENHYDRAMINE HYDROCHLORIDE 50 MG/ML
25 INJECTION INTRAMUSCULAR; INTRAVENOUS EVERY 6 HOURS PRN
Status: DISCONTINUED | OUTPATIENT
Start: 2019-02-13 | End: 2019-02-18 | Stop reason: HOSPADM

## 2019-02-13 RX ORDER — LEVETIRACETAM 500 MG/1
1000 TABLET ORAL 2 TIMES DAILY
Status: DISCONTINUED | OUTPATIENT
Start: 2019-02-13 | End: 2019-02-18 | Stop reason: HOSPADM

## 2019-02-13 RX ORDER — BISACODYL 10 MG
10 SUPPOSITORY, RECTAL RECTAL
Status: DISCONTINUED | OUTPATIENT
Start: 2019-02-13 | End: 2019-02-18 | Stop reason: HOSPADM

## 2019-02-13 RX ORDER — DIPHENHYDRAMINE HCL 25 MG
25 TABLET ORAL EVERY 6 HOURS PRN
Status: DISCONTINUED | OUTPATIENT
Start: 2019-02-13 | End: 2019-02-18 | Stop reason: HOSPADM

## 2019-02-13 RX ORDER — HYDROCHLOROTHIAZIDE 25 MG/1
25 TABLET ORAL DAILY
Status: DISCONTINUED | OUTPATIENT
Start: 2019-02-14 | End: 2019-02-18 | Stop reason: HOSPADM

## 2019-02-13 RX ORDER — AMOXICILLIN 250 MG
1 CAPSULE ORAL
Status: DISCONTINUED | OUTPATIENT
Start: 2019-02-13 | End: 2019-02-18 | Stop reason: HOSPADM

## 2019-02-13 RX ORDER — HYDRALAZINE HYDROCHLORIDE 20 MG/ML
5 INJECTION INTRAMUSCULAR; INTRAVENOUS
Status: DISCONTINUED | OUTPATIENT
Start: 2019-02-13 | End: 2019-02-13 | Stop reason: HOSPADM

## 2019-02-13 RX ORDER — OXYCODONE HCL 5 MG/5 ML
5 SOLUTION, ORAL ORAL
Status: COMPLETED | OUTPATIENT
Start: 2019-02-13 | End: 2019-02-13

## 2019-02-13 RX ORDER — HALOPERIDOL 5 MG/ML
1 INJECTION INTRAMUSCULAR
Status: DISCONTINUED | OUTPATIENT
Start: 2019-02-13 | End: 2019-02-13 | Stop reason: HOSPADM

## 2019-02-13 RX ORDER — HEPARIN SODIUM,PORCINE 1000/ML
VIAL (ML) INJECTION
Status: DISCONTINUED | OUTPATIENT
Start: 2019-02-13 | End: 2019-02-13 | Stop reason: HOSPADM

## 2019-02-13 RX ORDER — ONDANSETRON 4 MG/1
4 TABLET, ORALLY DISINTEGRATING ORAL EVERY 4 HOURS PRN
Status: DISCONTINUED | OUTPATIENT
Start: 2019-02-13 | End: 2019-02-18 | Stop reason: HOSPADM

## 2019-02-13 RX ORDER — SODIUM CHLORIDE AND POTASSIUM CHLORIDE 150; 900 MG/100ML; MG/100ML
INJECTION, SOLUTION INTRAVENOUS CONTINUOUS
Status: DISCONTINUED | OUTPATIENT
Start: 2019-02-13 | End: 2019-02-16 | Stop reason: ALTCHOICE

## 2019-02-13 RX ORDER — POLYETHYLENE GLYCOL 3350 17 G/17G
1 POWDER, FOR SOLUTION ORAL 2 TIMES DAILY PRN
Status: DISCONTINUED | OUTPATIENT
Start: 2019-02-13 | End: 2019-02-18 | Stop reason: HOSPADM

## 2019-02-13 RX ORDER — PROMETHAZINE HYDROCHLORIDE 25 MG/1
12.5-25 TABLET ORAL EVERY 4 HOURS PRN
Status: DISCONTINUED | OUTPATIENT
Start: 2019-02-13 | End: 2019-02-18 | Stop reason: HOSPADM

## 2019-02-13 RX ORDER — AMOXICILLIN 250 MG
1 CAPSULE ORAL NIGHTLY
Status: DISCONTINUED | OUTPATIENT
Start: 2019-02-13 | End: 2019-02-18 | Stop reason: HOSPADM

## 2019-02-13 RX ORDER — GABAPENTIN 300 MG/1
600 CAPSULE ORAL 4 TIMES DAILY
Status: DISCONTINUED | OUTPATIENT
Start: 2019-02-13 | End: 2019-02-18 | Stop reason: HOSPADM

## 2019-02-13 RX ORDER — CEFAZOLIN SODIUM 2 G/100ML
2 INJECTION, SOLUTION INTRAVENOUS EVERY 8 HOURS
Status: COMPLETED | OUTPATIENT
Start: 2019-02-13 | End: 2019-02-14

## 2019-02-13 RX ORDER — HYDROMORPHONE HYDROCHLORIDE 1 MG/ML
0.1 INJECTION, SOLUTION INTRAMUSCULAR; INTRAVENOUS; SUBCUTANEOUS
Status: DISCONTINUED | OUTPATIENT
Start: 2019-02-13 | End: 2019-02-13 | Stop reason: HOSPADM

## 2019-02-13 RX ORDER — METOPROLOL TARTRATE 1 MG/ML
1 INJECTION, SOLUTION INTRAVENOUS
Status: DISCONTINUED | OUTPATIENT
Start: 2019-02-13 | End: 2019-02-13 | Stop reason: HOSPADM

## 2019-02-13 RX ORDER — TIZANIDINE 4 MG/1
4 TABLET ORAL EVERY 8 HOURS PRN
Status: DISCONTINUED | OUTPATIENT
Start: 2019-02-13 | End: 2019-02-15

## 2019-02-13 RX ORDER — CEFAZOLIN SODIUM 1 G/3ML
INJECTION, POWDER, FOR SOLUTION INTRAMUSCULAR; INTRAVENOUS
Status: DISCONTINUED | OUTPATIENT
Start: 2019-02-13 | End: 2019-02-13 | Stop reason: HOSPADM

## 2019-02-13 RX ORDER — SODIUM CHLORIDE, SODIUM LACTATE, POTASSIUM CHLORIDE, CALCIUM CHLORIDE 600; 310; 30; 20 MG/100ML; MG/100ML; MG/100ML; MG/100ML
INJECTION, SOLUTION INTRAVENOUS CONTINUOUS
Status: DISCONTINUED | OUTPATIENT
Start: 2019-02-13 | End: 2019-02-13 | Stop reason: HOSPADM

## 2019-02-13 RX ORDER — ONDANSETRON 2 MG/ML
4 INJECTION INTRAMUSCULAR; INTRAVENOUS
Status: COMPLETED | OUTPATIENT
Start: 2019-02-13 | End: 2019-02-13

## 2019-02-13 RX ORDER — DIPHENHYDRAMINE HYDROCHLORIDE 50 MG/ML
12.5 INJECTION INTRAMUSCULAR; INTRAVENOUS
Status: DISCONTINUED | OUTPATIENT
Start: 2019-02-13 | End: 2019-02-13 | Stop reason: HOSPADM

## 2019-02-13 RX ORDER — PROMETHAZINE HYDROCHLORIDE 25 MG/1
12.5-25 SUPPOSITORY RECTAL EVERY 4 HOURS PRN
Status: DISCONTINUED | OUTPATIENT
Start: 2019-02-13 | End: 2019-02-18 | Stop reason: HOSPADM

## 2019-02-13 RX ADMIN — SODIUM CHLORIDE, SODIUM LACTATE, POTASSIUM CHLORIDE, CALCIUM CHLORIDE: 600; 310; 30; 20 INJECTION, SOLUTION INTRAVENOUS at 10:53

## 2019-02-13 RX ADMIN — GABAPENTIN 300 MG: 300 CAPSULE ORAL at 10:53

## 2019-02-13 RX ADMIN — Medication: at 19:57

## 2019-02-13 RX ADMIN — DOCUSATE SODIUM 100 MG: 100 CAPSULE, LIQUID FILLED ORAL at 22:52

## 2019-02-13 RX ADMIN — ONDANSETRON 4 MG: 2 INJECTION INTRAMUSCULAR; INTRAVENOUS at 18:55

## 2019-02-13 RX ADMIN — HYDROMORPHONE HYDROCHLORIDE 0.2 MG: 1 INJECTION, SOLUTION INTRAMUSCULAR; INTRAVENOUS; SUBCUTANEOUS at 19:00

## 2019-02-13 RX ADMIN — ACETAMINOPHEN 1000 MG: 500 TABLET, FILM COATED ORAL at 10:53

## 2019-02-13 RX ADMIN — OXYCODONE HYDROCHLORIDE 10 MG: 5 SOLUTION ORAL at 19:18

## 2019-02-13 RX ADMIN — HYDROMORPHONE HYDROCHLORIDE 0.2 MG: 1 INJECTION, SOLUTION INTRAMUSCULAR; INTRAVENOUS; SUBCUTANEOUS at 19:30

## 2019-02-13 RX ADMIN — SENNOSIDES AND DOCUSATE SODIUM 1 TABLET: 8.6; 5 TABLET ORAL at 22:44

## 2019-02-13 RX ADMIN — CEFAZOLIN SODIUM 2 G: 2 INJECTION, SOLUTION INTRAVENOUS at 22:44

## 2019-02-13 RX ADMIN — FENTANYL CITRATE 50 MCG: 50 INJECTION, SOLUTION INTRAMUSCULAR; INTRAVENOUS at 19:50

## 2019-02-13 RX ADMIN — HYDROMORPHONE HYDROCHLORIDE 0.2 MG: 1 INJECTION, SOLUTION INTRAMUSCULAR; INTRAVENOUS; SUBCUTANEOUS at 19:05

## 2019-02-13 RX ADMIN — HYDROMORPHONE HYDROCHLORIDE 0.2 MG: 1 INJECTION, SOLUTION INTRAMUSCULAR; INTRAVENOUS; SUBCUTANEOUS at 19:20

## 2019-02-13 RX ADMIN — HYDROMORPHONE HYDROCHLORIDE 0.2 MG: 1 INJECTION, SOLUTION INTRAMUSCULAR; INTRAVENOUS; SUBCUTANEOUS at 19:15

## 2019-02-13 RX ADMIN — POTASSIUM CHLORIDE AND SODIUM CHLORIDE: 900; 150 INJECTION, SOLUTION INTRAVENOUS at 22:37

## 2019-02-13 RX ADMIN — FENTANYL CITRATE 50 MCG: 50 INJECTION, SOLUTION INTRAMUSCULAR; INTRAVENOUS at 18:57

## 2019-02-13 RX ADMIN — GABAPENTIN 600 MG: 300 CAPSULE ORAL at 22:44

## 2019-02-13 RX ADMIN — LIDOCAINE HYDROCHLORIDE 0.5 ML: 10 INJECTION, SOLUTION EPIDURAL; INFILTRATION; INTRACAUDAL; PERINEURAL at 10:50

## 2019-02-13 ASSESSMENT — COGNITIVE AND FUNCTIONAL STATUS - GENERAL
MOVING TO AND FROM BED TO CHAIR: A LITTLE
SUGGESTED CMS G CODE MODIFIER DAILY ACTIVITY: CK
DRESSING REGULAR LOWER BODY CLOTHING: A LITTLE
DAILY ACTIVITIY SCORE: 18
PERSONAL GROOMING: A LITTLE
MOVING FROM LYING ON BACK TO SITTING ON SIDE OF FLAT BED: A LITTLE
MOBILITY SCORE: 19
DRESSING REGULAR UPPER BODY CLOTHING: A LITTLE
TOILETING: A LITTLE
HELP NEEDED FOR BATHING: A LITTLE
CLIMB 3 TO 5 STEPS WITH RAILING: A LITTLE
WALKING IN HOSPITAL ROOM: A LITTLE
SUGGESTED CMS G CODE MODIFIER MOBILITY: CK
STANDING UP FROM CHAIR USING ARMS: A LITTLE
EATING MEALS: A LITTLE

## 2019-02-13 ASSESSMENT — LIFESTYLE VARIABLES
ALCOHOL_USE: NO
EVER_SMOKED: NEVER

## 2019-02-13 ASSESSMENT — PATIENT HEALTH QUESTIONNAIRE - PHQ9
2. FEELING DOWN, DEPRESSED, IRRITABLE, OR HOPELESS: NOT AT ALL
SUM OF ALL RESPONSES TO PHQ9 QUESTIONS 1 AND 2: 0
1. LITTLE INTEREST OR PLEASURE IN DOING THINGS: NOT AT ALL

## 2019-02-14 LAB
ALBUMIN SERPL BCP-MCNC: 3 G/DL (ref 3.2–4.9)
ALBUMIN/GLOB SERPL: 2.1 G/DL
ALP SERPL-CCNC: 48 U/L (ref 30–99)
ALT SERPL-CCNC: 8 U/L (ref 2–50)
ANION GAP SERPL CALC-SCNC: 5 MMOL/L (ref 0–11.9)
ANION GAP SERPL CALC-SCNC: 5 MMOL/L (ref 0–11.9)
AST SERPL-CCNC: 19 U/L (ref 12–45)
BASOPHILS # BLD AUTO: 0.2 % (ref 0–1.8)
BASOPHILS # BLD: 0.01 K/UL (ref 0–0.12)
BILIRUB SERPL-MCNC: 0.6 MG/DL (ref 0.1–1.5)
BUN SERPL-MCNC: 10 MG/DL (ref 8–22)
BUN SERPL-MCNC: 12 MG/DL (ref 8–22)
CALCIUM SERPL-MCNC: 7.5 MG/DL (ref 8.5–10.5)
CALCIUM SERPL-MCNC: 8.1 MG/DL (ref 8.5–10.5)
CHLORIDE SERPL-SCNC: 106 MMOL/L (ref 96–112)
CHLORIDE SERPL-SCNC: 106 MMOL/L (ref 96–112)
CO2 SERPL-SCNC: 25 MMOL/L (ref 20–33)
CO2 SERPL-SCNC: 26 MMOL/L (ref 20–33)
CREAT SERPL-MCNC: 0.59 MG/DL (ref 0.5–1.4)
CREAT SERPL-MCNC: 0.6 MG/DL (ref 0.5–1.4)
EOSINOPHIL # BLD AUTO: 0.04 K/UL (ref 0–0.51)
EOSINOPHIL NFR BLD: 0.8 % (ref 0–6.9)
ERYTHROCYTE [DISTWIDTH] IN BLOOD BY AUTOMATED COUNT: 44.2 FL (ref 35.9–50)
ERYTHROCYTE [DISTWIDTH] IN BLOOD BY AUTOMATED COUNT: 45.1 FL (ref 35.9–50)
GLOBULIN SER CALC-MCNC: 1.4 G/DL (ref 1.9–3.5)
GLUCOSE SERPL-MCNC: 113 MG/DL (ref 65–99)
GLUCOSE SERPL-MCNC: 95 MG/DL (ref 65–99)
HCT VFR BLD AUTO: 25.7 % (ref 37–47)
HCT VFR BLD AUTO: 26.9 % (ref 37–47)
HGB BLD-MCNC: 8.5 G/DL (ref 12–16)
HGB BLD-MCNC: 8.9 G/DL (ref 12–16)
IMM GRANULOCYTES # BLD AUTO: 0.02 K/UL (ref 0–0.11)
IMM GRANULOCYTES NFR BLD AUTO: 0.4 % (ref 0–0.9)
LYMPHOCYTES # BLD AUTO: 0.82 K/UL (ref 1–4.8)
LYMPHOCYTES NFR BLD: 17.3 % (ref 22–41)
MCH RBC QN AUTO: 33.5 PG (ref 27–33)
MCH RBC QN AUTO: 33.5 PG (ref 27–33)
MCHC RBC AUTO-ENTMCNC: 33.1 G/DL (ref 33.6–35)
MCHC RBC AUTO-ENTMCNC: 33.1 G/DL (ref 33.6–35)
MCV RBC AUTO: 101.1 FL (ref 81.4–97.8)
MCV RBC AUTO: 101.2 FL (ref 81.4–97.8)
MONOCYTES # BLD AUTO: 0.33 K/UL (ref 0–0.85)
MONOCYTES NFR BLD AUTO: 7 % (ref 0–13.4)
NEUTROPHILS # BLD AUTO: 3.52 K/UL (ref 2–7.15)
NEUTROPHILS NFR BLD: 74.3 % (ref 44–72)
NRBC # BLD AUTO: 0 K/UL
NRBC BLD-RTO: 0 /100 WBC
PLATELET # BLD AUTO: 129 K/UL (ref 164–446)
PLATELET # BLD AUTO: 153 K/UL (ref 164–446)
PMV BLD AUTO: 8.7 FL (ref 9–12.9)
PMV BLD AUTO: 9.2 FL (ref 9–12.9)
POTASSIUM SERPL-SCNC: 3.5 MMOL/L (ref 3.6–5.5)
POTASSIUM SERPL-SCNC: 3.6 MMOL/L (ref 3.6–5.5)
PROT SERPL-MCNC: 4.4 G/DL (ref 6–8.2)
RBC # BLD AUTO: 2.54 M/UL (ref 4.2–5.4)
RBC # BLD AUTO: 2.63 M/UL (ref 4.2–5.4)
SODIUM SERPL-SCNC: 136 MMOL/L (ref 135–145)
SODIUM SERPL-SCNC: 137 MMOL/L (ref 135–145)
WBC # BLD AUTO: 4.7 K/UL (ref 4.8–10.8)
WBC # BLD AUTO: 5.9 K/UL (ref 4.8–10.8)

## 2019-02-14 PROCEDURE — 85025 COMPLETE CBC W/AUTO DIFF WBC: CPT

## 2019-02-14 PROCEDURE — 700102 HCHG RX REV CODE 250 W/ 637 OVERRIDE(OP): Performed by: NEUROLOGICAL SURGERY

## 2019-02-14 PROCEDURE — 700102 HCHG RX REV CODE 250 W/ 637 OVERRIDE(OP): Performed by: PHYSICIAN ASSISTANT

## 2019-02-14 PROCEDURE — A9270 NON-COVERED ITEM OR SERVICE: HCPCS | Performed by: NURSE PRACTITIONER

## 2019-02-14 PROCEDURE — 36415 COLL VENOUS BLD VENIPUNCTURE: CPT

## 2019-02-14 PROCEDURE — A9270 NON-COVERED ITEM OR SERVICE: HCPCS | Performed by: NEUROLOGICAL SURGERY

## 2019-02-14 PROCEDURE — 97165 OT EVAL LOW COMPLEX 30 MIN: CPT

## 2019-02-14 PROCEDURE — 770006 HCHG ROOM/CARE - MED/SURG/GYN SEMI*

## 2019-02-14 PROCEDURE — 700101 HCHG RX REV CODE 250: Performed by: NURSE PRACTITIONER

## 2019-02-14 PROCEDURE — 700102 HCHG RX REV CODE 250 W/ 637 OVERRIDE(OP): Performed by: NURSE PRACTITIONER

## 2019-02-14 PROCEDURE — A9270 NON-COVERED ITEM OR SERVICE: HCPCS | Performed by: PHYSICIAN ASSISTANT

## 2019-02-14 PROCEDURE — 700112 HCHG RX REV CODE 229: Performed by: NURSE PRACTITIONER

## 2019-02-14 PROCEDURE — 306637 HCHG MISC ORTHO ITEM RC 0274

## 2019-02-14 PROCEDURE — 85027 COMPLETE CBC AUTOMATED: CPT

## 2019-02-14 PROCEDURE — 700111 HCHG RX REV CODE 636 W/ 250 OVERRIDE (IP): Performed by: NEUROLOGICAL SURGERY

## 2019-02-14 PROCEDURE — 80053 COMPREHEN METABOLIC PANEL: CPT

## 2019-02-14 PROCEDURE — 700105 HCHG RX REV CODE 258: Performed by: PHYSICIAN ASSISTANT

## 2019-02-14 PROCEDURE — 97161 PT EVAL LOW COMPLEX 20 MIN: CPT

## 2019-02-14 PROCEDURE — 700105 HCHG RX REV CODE 258: Performed by: NEUROLOGICAL SURGERY

## 2019-02-14 PROCEDURE — L0486 TLSO RIGIDLINED CUST FAB TWO: HCPCS

## 2019-02-14 PROCEDURE — 700111 HCHG RX REV CODE 636 W/ 250 OVERRIDE (IP): Performed by: NURSE PRACTITIONER

## 2019-02-14 PROCEDURE — 80048 BASIC METABOLIC PNL TOTAL CA: CPT

## 2019-02-14 RX ORDER — MEPERIDINE HYDROCHLORIDE 50 MG/ML
50 INJECTION INTRAMUSCULAR; INTRAVENOUS; SUBCUTANEOUS ONCE
Status: COMPLETED | OUTPATIENT
Start: 2019-02-14 | End: 2019-02-14

## 2019-02-14 RX ORDER — HYDROMORPHONE HYDROCHLORIDE 2 MG/1
2 TABLET ORAL EVERY 4 HOURS PRN
Status: DISCONTINUED | OUTPATIENT
Start: 2019-02-14 | End: 2019-02-14

## 2019-02-14 RX ORDER — SODIUM CHLORIDE 9 MG/ML
500 INJECTION, SOLUTION INTRAVENOUS ONCE
Status: COMPLETED | OUTPATIENT
Start: 2019-02-14 | End: 2019-02-14

## 2019-02-14 RX ORDER — HYDROCODONE BITARTRATE AND ACETAMINOPHEN 10; 325 MG/1; MG/1
1 TABLET ORAL EVERY 4 HOURS PRN
Status: DISCONTINUED | OUTPATIENT
Start: 2019-02-14 | End: 2019-02-14

## 2019-02-14 RX ORDER — DEXAMETHASONE 4 MG/1
4 TABLET ORAL EVERY 6 HOURS
Status: DISCONTINUED | OUTPATIENT
Start: 2019-02-14 | End: 2019-02-18 | Stop reason: HOSPADM

## 2019-02-14 RX ADMIN — DEXAMETHASONE 4 MG: 4 TABLET ORAL at 18:00

## 2019-02-14 RX ADMIN — POTASSIUM CHLORIDE AND SODIUM CHLORIDE: 900; 150 INJECTION, SOLUTION INTRAVENOUS at 21:02

## 2019-02-14 RX ADMIN — HYDROMORPHONE HYDROCHLORIDE 2 MG: 2 TABLET ORAL at 02:00

## 2019-02-14 RX ADMIN — SODIUM CHLORIDE 500 ML: 9 INJECTION, SOLUTION INTRAVENOUS at 00:45

## 2019-02-14 RX ADMIN — HYDROMORPHONE HYDROCHLORIDE 2 MG: 2 TABLET ORAL at 05:59

## 2019-02-14 RX ADMIN — SODIUM CHLORIDE 500 ML: 9 INJECTION, SOLUTION INTRAVENOUS at 09:00

## 2019-02-14 RX ADMIN — DEXAMETHASONE 4 MG: 4 TABLET ORAL at 10:04

## 2019-02-14 RX ADMIN — TIZANIDINE 4 MG: 4 TABLET ORAL at 23:29

## 2019-02-14 RX ADMIN — GABAPENTIN 600 MG: 300 CAPSULE ORAL at 13:41

## 2019-02-14 RX ADMIN — DEXAMETHASONE 4 MG: 4 TABLET ORAL at 23:19

## 2019-02-14 RX ADMIN — TIZANIDINE 4 MG: 4 TABLET ORAL at 03:10

## 2019-02-14 RX ADMIN — GABAPENTIN 600 MG: 300 CAPSULE ORAL at 21:02

## 2019-02-14 RX ADMIN — GABAPENTIN 600 MG: 300 CAPSULE ORAL at 17:21

## 2019-02-14 RX ADMIN — GABAPENTIN 600 MG: 300 CAPSULE ORAL at 07:53

## 2019-02-14 RX ADMIN — HYDROMORPHONE HYDROCHLORIDE: 10 INJECTION, SOLUTION INTRAMUSCULAR; INTRAVENOUS; SUBCUTANEOUS at 07:55

## 2019-02-14 RX ADMIN — MINERAL OIL AND WHITE PETROLATUM 1 APPLICATION: 150; 830 OINTMENT OPHTHALMIC at 10:04

## 2019-02-14 RX ADMIN — POLYVINYL ALCOHOL 1 DROP: 14 SOLUTION/ DROPS OPHTHALMIC at 00:28

## 2019-02-14 RX ADMIN — POTASSIUM CHLORIDE AND SODIUM CHLORIDE: 900; 150 INJECTION, SOLUTION INTRAVENOUS at 11:04

## 2019-02-14 RX ADMIN — CEFAZOLIN SODIUM 2 G: 2 INJECTION, SOLUTION INTRAVENOUS at 05:39

## 2019-02-14 RX ADMIN — HYDROCODONE BITARTRATE AND ACETAMINOPHEN 1 TABLET: 10; 325 TABLET ORAL at 02:57

## 2019-02-14 RX ADMIN — TIZANIDINE 4 MG: 4 TABLET ORAL at 11:08

## 2019-02-14 RX ADMIN — MEPERIDINE HYDROCHLORIDE 50 MG: 50 INJECTION INTRAMUSCULAR; INTRAVENOUS; SUBCUTANEOUS at 07:51

## 2019-02-14 RX ADMIN — LEVETIRACETAM 1000 MG: 500 TABLET ORAL at 17:21

## 2019-02-14 RX ADMIN — DEXAMETHASONE 4 MG: 4 TABLET ORAL at 13:41

## 2019-02-14 RX ADMIN — MINERAL OIL AND WHITE PETROLATUM 1 APPLICATION: 150; 830 OINTMENT OPHTHALMIC at 21:02

## 2019-02-14 RX ADMIN — SODIUM CHLORIDE 500 ML: 9 INJECTION, SOLUTION INTRAVENOUS at 08:02

## 2019-02-14 RX ADMIN — MINERAL OIL AND WHITE PETROLATUM 1 APPLICATION: 150; 830 OINTMENT OPHTHALMIC at 13:42

## 2019-02-14 RX ADMIN — SENNOSIDES AND DOCUSATE SODIUM 1 TABLET: 8.6; 5 TABLET ORAL at 21:03

## 2019-02-14 ASSESSMENT — COGNITIVE AND FUNCTIONAL STATUS - GENERAL
CLIMB 3 TO 5 STEPS WITH RAILING: A LITTLE
SUGGESTED CMS G CODE MODIFIER MOBILITY: CK
HELP NEEDED FOR BATHING: A LOT
STANDING UP FROM CHAIR USING ARMS: A LITTLE
DAILY ACTIVITIY SCORE: 16
MOVING FROM LYING ON BACK TO SITTING ON SIDE OF FLAT BED: A LITTLE
PERSONAL GROOMING: A LITTLE
MOBILITY SCORE: 19
TOILETING: A LOT
SUGGESTED CMS G CODE MODIFIER DAILY ACTIVITY: CK
WALKING IN HOSPITAL ROOM: A LITTLE
MOVING TO AND FROM BED TO CHAIR: A LITTLE
DRESSING REGULAR UPPER BODY CLOTHING: A LITTLE
DRESSING REGULAR LOWER BODY CLOTHING: A LOT

## 2019-02-14 ASSESSMENT — ACTIVITIES OF DAILY LIVING (ADL): TOILETING: INDEPENDENT

## 2019-02-14 ASSESSMENT — LIFESTYLE VARIABLES: EVER_SMOKED: NEVER

## 2019-02-14 ASSESSMENT — GAIT ASSESSMENTS: GAIT LEVEL OF ASSIST: UNABLE TO PARTICIPATE

## 2019-02-14 NOTE — PROGRESS NOTES
Patient arrived to the unit from PACU by bed.aaox4.pleasant.lower back pain up right side of hips that she stated pain much better now compared to before surgery.on dilaudid PCA for pain.hemovac compressed.hale to drainage.patient able to sleep.continuing hourly rounding.

## 2019-02-14 NOTE — CARE PLAN
Problem: Safety  Goal: Will remain free from falls    Intervention: Implement fall precautions  Bed alarm and fall precautions in place.      Problem: Fluid Volume:  Goal: Will maintain balanced intake and output    Intervention: Monitor, educate, and encourage compliance with therapeutic intake of liquids  Taking PO well. Bolus infusing.

## 2019-02-14 NOTE — THERAPY
"Physical Therapy Evaluation completed.   Bed Mobility:  Supine to Sit: Contact Guard Assist  Transfers: Sit to Stand: Unable to Participate  Gait: Level Of Assist: Unable to Participate  Plan of Care: Will benefit from Physical Therapy 4 times per week  Discharge Recommendations: Equipment: Front-Wheel Walker.   Pt is a 57 yo female s/p L2-S1 fusion, awaiting TLSO to arrive, but eager to sit up at EOB today. Pt showed a good log roll and tolerance for sitting up x 10 min. Will complete eval once brace arrives. Pt has good help at home.   See \"Rehab Therapy-Acute\" Patient Summary Report for complete documentation.     "

## 2019-02-14 NOTE — OP REPORT
DATE OF SERVICE:  02/13/2019    PREOPERATIVE DIAGNOSES:   1.  Recurrent lumbar stenosis, right L4-5.  2.  Lumbar stenosis, L2-3, L3-4, with bilateral lumbar radiculopathy.  3.  L5-S1 degenerative disk disease with loss of disk space height and   bilateral foraminal stenosis.  4.  L3-4 lateral spondylolisthesis to the right with foraminal stenosis.    POSTOPERATIVE DIAGNOSES:  1.  Recurrent lumbar stenosis, right L4-5.  2.  Lumbar stenosis, L2-3, L3-4, with bilateral lumbar radiculopathy.  3.  L5-S1 degenerative disk disease with loss of disk space height and   bilateral foraminal stenosis.  4.  L3-4 lateral spondylolisthesis to the right with foraminal stenosis.    PROCEDURES:  1.  Bilateral L5 facetectomy - complete - redo.  2.  Bilateral L3 facetectomy - complete for reduction of spondylolisthesis and   scoliotic curve.  3.  Left L2 facetectomy, for correction of scoliotic curvature and   decompression of severe foraminal stenosis.  4.  L2 laminectomy with medial facetectomy and bilateral foraminotomies.  5.  L4 partial pediculectomy with decompression of L4 nerve root and redo   foraminotomy, L4-5.  6.  L3-4 right transforaminal lumbar interbody fusion for scoliotic curvature   and correction of spondylolisthesis.  7.  L5-S1 left transforaminal lumbar interbody fusion.  8.  Posterolateral fusion, L2, L3, L4, L5, S1.  9.  Instrumented stabilization, L2 through S1 with correction of scoliotic   curvature and stabilization of L2 through S1 interspaces.  10.  Use of O-arm with sterotactic placement of pedicular screws; L2,   bilateral, L3, bilateral, L5 on the right, and bilateral S1 pedicle screws.    SURGEON:  Shawn Carter MD    ASSISTANT:  PAT Mireles    ANESTHESIA:  General anesthetic.    ANESTHESIOLOGIST:  Yg Flowers DO    PREPARATION:  Patient had somatosensory evoked potentials and EMGs monitored   throughout the case.  Patient also had Najera catheter and sequential stockings   in place.   We were able to use the Cell Saver, during the case and collected   approximately 500 mL of blood with the patient given back 250 mL of Cell   Saver.    INDICATION:  The patient has been through multiple operations on her lumbar   spine.  She has developed progressive scoliotic curvature, which is   degenerative with compression of the foramina at L2-3, L3-4, and recurrent   L4-5 with severe right-sided radiculopathy.  She also has degenerative disk   disease at L5-S1 with a previous decompression being performed here and a   fusion at L4-5 with reduction of spondylolisthesis.    She has been through extensive physical therapy, she has been through years of   pain management, and has really suffered with this for approximately 4 years   although we have attempted smaller procedures to attempt to correct her   problem.  The indications and possible complications ____ and the procedure   were explained to the patient including the fact that she may not get better.    Informed consent was obtained.    DESCRIPTION OF PROCEDURE:  Patient was brought to the operating room, and   following induction, she was intubated and placed under general anesthetic.    Najera catheter sterilely placed and she had sequential stockings placed.  She   was prepared for somatosensory evoked potentials, motor evoked and EMGs.  She   was rolled prone onto the operating room table using the chest, hip, thigh   pads.    The back was prepped and draped in a sterile fashion and the proposed incision   site was injected with Marcaine 0.5% with epinephrine 20 mL.  This was done   after a timeout.  An incision was made at L1-2, L2-3 and carried down to the   sacrum.  We did a subperiosteal dissection over the L2 spinous process, L3   spinous process and dissected out the instrumentation at L4-5 on the left.  We   dissected out the facet that had been fused on the right at L4-5, as well as   L5-S1 including the sacrum.  Retractors were placed to  maintain exposure as we   dissected over the transverse processes of L2 bilateral, L3 bilateral, over   the fusion mass at L4-5, and over the ala of the sacrum.  Decortication was   performed and blood from the decortication as well as bone graft was collected   and used to reconstitute 30 mL of allograft chips.  Meticulous hemostasis was   obtained.    At this time, we did an O-arm run, with the fiducials at L2.  After our O-arm   run, we were able to on the left hand side with sterotactic technique identify   the transverse process and the entrance where the pedicular screw of L2 on   the left.  We drilled with the Midas Joseph under sterotactic guidance, followed   by the Palingen navigation system, a 5.5 tap with placement of a 6.5x45 mm   screw here.    At L3, we were able to dissect out over the transverse process, obtain   meticulous hemostasis and drill down the axis of the pedicle of L3 with the   Midas Joseph followed by the DJZuard navigation system and a 5.5 tap with   placement of 6.5x50 mm screw here under stereotactic guidance.    There were pedicle screws in L4 and L5 and we subsequently went down to L5-S1.    We did a complete facetectomy here, identified the nerve root of S1 as well as   L5, and with the navigation system, we were able to drill down the axis of   the pedicle with the Midas Joseph followed by the Delivery Herord navigation system, a   5.5 tap and placed a 6.5x40 mm screw.    At this time, we went to the contralateral side, and after identifying   transverse process of L2 and using the navigation system, we were able to   drill down the axis of the pedicle of L2 with the Midas Joseph, which was   navigated followed by the 5.5 tap and placed a 6.5x45 mm screw.  L3 was   identified with the neuro navigation system, transverse process and the entry   point.  We drilled down the axis with the Midas Joseph followed by the Palingen   navigation system and the 5.5 tap, which was navigated with placement of  a   navigated 6.5x50 mm screw.  The L4 pedicle was medially displaced, and due to   the scoliosis, we did not drill down the axis of the pedicle here, but we were   able to identify L5 pedicle medially.  After removing scar tissue, we used   the neuro navigation system and we were able to drill down the axis of the   pedicle with the Midas Joseph followed by the Aucteliauard navigation system, the   5.5 tap, which was navigated and placed a 6.5x40 mm screw.  Finally, S1 on   this side was navigated and we drilled down the axis of the pedicle with the   Midas Joseph followed by the Aucteliauard navigation system and placed a 6.5x35 mm   screw after tapping with a navigated system.    At L2 on the right hand side, we did a laminectomy after removing the spinous   process.  We drilled the lamina to a thin shelf and used Kerrisons to   decompress completely.  We dissected out the L3 lamina, which was markedly   hypertrophied on the right hand side, drilled the pars bilaterally, and   removed the inferior facets to loosen this segment as this was where the   patient had a spondylolisthesis laterally as well as severe foraminal stenosis   on the right.    Approaching from the right, I was able to identify the disk space, open it   with 11 bladed scalpel and prepare the endplate with paddle ross.  We   started with a 4.5 paddle shaver and proceeded to a 9 paddle shaver going down   the disk space.  Curettes were used to remove degenerative disk material and   the disk space was packed.  We then placed a Rise expandable spacer 8-14 mm   with 10-degree lordosis anteriorly into the right.    Opening this up on the inside of the curvature of our scoliosis, we were able   to correct this by about 5-10 degrees.    We further came down and dissected the thecal sac free from the facet at L4-5   and L5-S1.    We found that the pedicle of L4 and the pedicle of L5 were very close   together.  The pedicle of L4 was also medially displaced.  We  were able to   dissect this free and drill 50% of the pedicle, decompressing the thecal sac   and opening around the pedicle of L4, inferiorly removing scar tissue and   facet cyst.  We decompressed out laterally, we were able to subsequently   decompress around the pedicle of L5 and drill down the axis of the pedicle   with the Midas Joseph followed by the placement of the 6.5x40 mm screw.    At S1, we decompressed L5 bilaterally, and on the left hand side, we   approached the disk space, opened it with 11 bladed scalpel, prepared the   endplates with paddle ross starting with a 5 mm paddle shaver and   progressing to a 9 mm paddle shaver.  Here, we were able to place locally   harvested bone graft followed by the placement of a Rise expandable spacer   8-14 mm with 10-degree lordosis opening up to approximately 14 mm and widely   opening the foraminal stenosis here.    We went back and decompressed the left hand side around the pedicle of L2, L3,   L4, and L5,  decompressing the thecal sac as well as the nerve roots.    Locally harvested bone graft and allograft chips were then placed into the   posterolateral gutters at L2-3, L3-4, L4-5 and we had to drill down the large   fusion mass on the right hand side at the L4-5 level to put more of the graft.    On the right hand side, we were able to place a 110 mm ny, this was secured   to L5, L4, L3 and we were able to distract the L2-3 level approximately 8 mm.    This further corrected the scoliotic curvature.  We also distracted at L3-4   approximately 4-5 mm.  The graft within the disk space or the Rise expandable   spacer was opened up further to approximately 12 mm.  Going to the left hand   side, we were able to place the ny into L2, L3, and the L4-5 pedicle screws   as well as L5-S1.    Set screws were secured and we compressed after complete facetectomy at L3-4   on this side to further correct the scoliotic curvature.  Nerve roots were   completely free at  L2 bilaterally, L3 bilaterally, L4 bilaterally and L5-S1   bilaterally.  By placing the interbody spacer at L5-S1 and opening this up,   loosening the inferior screws at S1, we were able to further correct the   scoliotic curvature  tightening the set screws down at this point.    Intraoperative x-ray confirmed correction of the scoliotic curvature to a   great degree.  We were able to further complete this with further compression   at the L2-3 level with a complete facetectomy of L2 on the right.    The area was copiously irrigated.  Meticulous hemostasis was obtained.    Intraoperative x-ray was again done in the AP position, but we did another   spin with the O-arm to confirm good positioning of instrumentation, correction   of scoliotic curvature and excellent sagittal balance.    At this time, a gram of vancomycin was placed followed by a medium size   Hemovac.  We closed the fascia with #1 Vicryl suture, subcutaneous tissue with   2-0 Vicryl, subcuticular with 3-0 Vicryl and skin with staples.  All sponge   and needle counts were correct.  Estimated blood loss for the procedure was   approximately 500 mL.       ____________________________________     MD TORRIE LANDA / TAN    DD:  02/13/2019 19:23:17  DT:  02/13/2019 21:06:50    D#:  [id number]  Job#:  [id number]

## 2019-02-14 NOTE — PROGRESS NOTES
Faxed custom TLSO back brace order to [facility]  If you have any further questions please call [facility] at [phone]

## 2019-02-14 NOTE — PROGRESS NOTES
Fluid bolus given. BP 95/61, HR 77after first bolus. Repeated x1 per order. BP and H&H 8.5/25.7 called to Hyacinth DREW. [facility] at bedside to cast TLSO. Pt reports better pain control after PCA restarted and lacrilube.

## 2019-02-14 NOTE — CARE PLAN
Problem: Safety  Goal: Will remain free from falls  Outcome: PROGRESSING AS EXPECTED      Problem: Pain Management  Goal: Pain level will decrease to patient's comfort goal  Outcome: PROGRESSING AS EXPECTED  Instructed the use of PCA

## 2019-02-14 NOTE — PROGRESS NOTES
Called Catia,informed her patient low BP after IVF bolus,got an order to D/C dilaudid PCA,start Norco tablet for moderate pain & dilaudid PO tablet for severe pain.

## 2019-02-14 NOTE — OR NURSING
1925 Dr. Carter at bedside assessing Pt. Pt able to perform dorsi/plantar flexion with resistance. Pt denies numbness or tingling. Pt reports significant pain to back.

## 2019-02-14 NOTE — PROGRESS NOTES
Pt Aox4. MOON. Denies n/t. Reporting pain to back, groin, hip on right 10/10. Also reporting pain to right eye from tape during surgery. Dr. Carter at bedside this am. Lacrilube ordered. PCA restarted and IM Demerol x1. NS bous infusing now. BP this am 97/63. Pt denies feeling lightheaded. CBC ordered. Patient updated on plan of care. Verbalized understanding. Able to make needs known.

## 2019-02-14 NOTE — OR NURSING
Pt AA/Ox4. VSS. Dressing to back, CDI. CMS+. Pt reports 6/10 pain scale at this time. Pain medications given. PCA-Dilaudid set-up and given to Pt. Pt denies numbness or tingling. No nausea or vomiting. SCDs in place. Intact hemovac to back, drained 195cc serosanguinous drainage. Intact hale catheter with good urine output. Report given to KETURAH Garcia. Pt's  updated.    Pt via bed, accompanied by transport, was transferred to [facility] at 2128. All personal belongings sent with Pt.

## 2019-02-15 LAB
ANION GAP SERPL CALC-SCNC: 6 MMOL/L (ref 0–11.9)
BUN SERPL-MCNC: 7 MG/DL (ref 8–22)
CALCIUM SERPL-MCNC: 8.7 MG/DL (ref 8.5–10.5)
CHLORIDE SERPL-SCNC: 108 MMOL/L (ref 96–112)
CO2 SERPL-SCNC: 24 MMOL/L (ref 20–33)
CREAT SERPL-MCNC: 0.55 MG/DL (ref 0.5–1.4)
ERYTHROCYTE [DISTWIDTH] IN BLOOD BY AUTOMATED COUNT: 43.2 FL (ref 35.9–50)
GLUCOSE SERPL-MCNC: 177 MG/DL (ref 65–99)
HCT VFR BLD AUTO: 24.4 % (ref 37–47)
HGB BLD-MCNC: 8.1 G/DL (ref 12–16)
MCH RBC QN AUTO: 33.2 PG (ref 27–33)
MCHC RBC AUTO-ENTMCNC: 33.2 G/DL (ref 33.6–35)
MCV RBC AUTO: 100 FL (ref 81.4–97.8)
PLATELET # BLD AUTO: 113 K/UL (ref 164–446)
PMV BLD AUTO: 9.2 FL (ref 9–12.9)
POTASSIUM SERPL-SCNC: 4.1 MMOL/L (ref 3.6–5.5)
RBC # BLD AUTO: 2.44 M/UL (ref 4.2–5.4)
SODIUM SERPL-SCNC: 138 MMOL/L (ref 135–145)
WBC # BLD AUTO: 5.9 K/UL (ref 4.8–10.8)

## 2019-02-15 PROCEDURE — 97535 SELF CARE MNGMENT TRAINING: CPT

## 2019-02-15 PROCEDURE — 770006 HCHG ROOM/CARE - MED/SURG/GYN SEMI*

## 2019-02-15 PROCEDURE — 700101 HCHG RX REV CODE 250: Performed by: NURSE PRACTITIONER

## 2019-02-15 PROCEDURE — A9270 NON-COVERED ITEM OR SERVICE: HCPCS | Performed by: NURSE PRACTITIONER

## 2019-02-15 PROCEDURE — 700112 HCHG RX REV CODE 229: Performed by: NURSE PRACTITIONER

## 2019-02-15 PROCEDURE — 700111 HCHG RX REV CODE 636 W/ 250 OVERRIDE (IP): Performed by: NEUROLOGICAL SURGERY

## 2019-02-15 PROCEDURE — 85027 COMPLETE CBC AUTOMATED: CPT

## 2019-02-15 PROCEDURE — 97530 THERAPEUTIC ACTIVITIES: CPT

## 2019-02-15 PROCEDURE — 700102 HCHG RX REV CODE 250 W/ 637 OVERRIDE(OP): Performed by: NURSE PRACTITIONER

## 2019-02-15 PROCEDURE — 700102 HCHG RX REV CODE 250 W/ 637 OVERRIDE(OP): Performed by: NEUROLOGICAL SURGERY

## 2019-02-15 PROCEDURE — 36415 COLL VENOUS BLD VENIPUNCTURE: CPT

## 2019-02-15 PROCEDURE — 80048 BASIC METABOLIC PNL TOTAL CA: CPT

## 2019-02-15 PROCEDURE — 700111 HCHG RX REV CODE 636 W/ 250 OVERRIDE (IP): Performed by: NURSE PRACTITIONER

## 2019-02-15 PROCEDURE — 97116 GAIT TRAINING THERAPY: CPT

## 2019-02-15 PROCEDURE — A9270 NON-COVERED ITEM OR SERVICE: HCPCS | Performed by: NEUROLOGICAL SURGERY

## 2019-02-15 RX ORDER — HYDROMORPHONE HYDROCHLORIDE 1 MG/ML
1 INJECTION, SOLUTION INTRAMUSCULAR; INTRAVENOUS; SUBCUTANEOUS
Status: DISCONTINUED | OUTPATIENT
Start: 2019-02-15 | End: 2019-02-18 | Stop reason: HOSPADM

## 2019-02-15 RX ORDER — CALCIUM CARBONATE 500 MG/1
500 TABLET, CHEWABLE ORAL EVERY 4 HOURS PRN
Status: DISCONTINUED | OUTPATIENT
Start: 2019-02-15 | End: 2019-02-18 | Stop reason: HOSPADM

## 2019-02-15 RX ORDER — TIZANIDINE 4 MG/1
2 TABLET ORAL EVERY 8 HOURS PRN
Status: DISCONTINUED | OUTPATIENT
Start: 2019-02-15 | End: 2019-02-17

## 2019-02-15 RX ORDER — FAMOTIDINE 20 MG/1
20 TABLET, FILM COATED ORAL 2 TIMES DAILY
Status: DISCONTINUED | OUTPATIENT
Start: 2019-02-15 | End: 2019-02-18 | Stop reason: HOSPADM

## 2019-02-15 RX ORDER — HYDROMORPHONE HYDROCHLORIDE 2 MG/1
2-4 TABLET ORAL EVERY 4 HOURS PRN
Status: DISCONTINUED | OUTPATIENT
Start: 2019-02-15 | End: 2019-02-18 | Stop reason: HOSPADM

## 2019-02-15 RX ADMIN — DEXAMETHASONE 4 MG: 4 TABLET ORAL at 23:38

## 2019-02-15 RX ADMIN — POLYVINYL ALCOHOL 1 DROP: 14 SOLUTION/ DROPS OPHTHALMIC at 22:31

## 2019-02-15 RX ADMIN — HYDROMORPHONE HYDROCHLORIDE 4 MG: 2 TABLET ORAL at 10:27

## 2019-02-15 RX ADMIN — METOPROLOL TARTRATE 25 MG: 25 TABLET, FILM COATED ORAL at 17:26

## 2019-02-15 RX ADMIN — DEXAMETHASONE 4 MG: 4 TABLET ORAL at 12:51

## 2019-02-15 RX ADMIN — HYDROMORPHONE HYDROCHLORIDE 1 MG: 1 INJECTION, SOLUTION INTRAMUSCULAR; INTRAVENOUS; SUBCUTANEOUS at 17:25

## 2019-02-15 RX ADMIN — HYDROMORPHONE HYDROCHLORIDE 4 MG: 2 TABLET ORAL at 14:17

## 2019-02-15 RX ADMIN — DOCUSATE SODIUM 100 MG: 100 CAPSULE, LIQUID FILLED ORAL at 17:26

## 2019-02-15 RX ADMIN — GABAPENTIN 600 MG: 300 CAPSULE ORAL at 22:31

## 2019-02-15 RX ADMIN — GABAPENTIN 600 MG: 300 CAPSULE ORAL at 12:50

## 2019-02-15 RX ADMIN — FAMOTIDINE 20 MG: 20 TABLET ORAL at 14:17

## 2019-02-15 RX ADMIN — HYDROMORPHONE HYDROCHLORIDE: 10 INJECTION, SOLUTION INTRAMUSCULAR; INTRAVENOUS; SUBCUTANEOUS at 04:29

## 2019-02-15 RX ADMIN — DOCUSATE SODIUM 100 MG: 100 CAPSULE, LIQUID FILLED ORAL at 05:52

## 2019-02-15 RX ADMIN — DEXAMETHASONE 4 MG: 4 TABLET ORAL at 05:52

## 2019-02-15 RX ADMIN — SENNOSIDES AND DOCUSATE SODIUM 1 TABLET: 8.6; 5 TABLET ORAL at 22:31

## 2019-02-15 RX ADMIN — HYDROMORPHONE HYDROCHLORIDE 1 MG: 1 INJECTION, SOLUTION INTRAMUSCULAR; INTRAVENOUS; SUBCUTANEOUS at 21:20

## 2019-02-15 RX ADMIN — MINERAL OIL AND WHITE PETROLATUM 1 APPLICATION: 150; 830 OINTMENT OPHTHALMIC at 12:52

## 2019-02-15 RX ADMIN — HYDROMORPHONE HYDROCHLORIDE 2 MG: 2 TABLET ORAL at 18:31

## 2019-02-15 RX ADMIN — LEVETIRACETAM 1000 MG: 500 TABLET ORAL at 17:26

## 2019-02-15 RX ADMIN — GABAPENTIN 600 MG: 300 CAPSULE ORAL at 17:25

## 2019-02-15 RX ADMIN — POTASSIUM CHLORIDE AND SODIUM CHLORIDE: 900; 150 INJECTION, SOLUTION INTRAVENOUS at 06:58

## 2019-02-15 RX ADMIN — GABAPENTIN 600 MG: 300 CAPSULE ORAL at 08:22

## 2019-02-15 RX ADMIN — ANTACID TABLETS 500 MG: 500 TABLET, CHEWABLE ORAL at 23:39

## 2019-02-15 RX ADMIN — DEXAMETHASONE 4 MG: 4 TABLET ORAL at 17:25

## 2019-02-15 RX ADMIN — HYDROMORPHONE HYDROCHLORIDE 2 MG: 2 TABLET ORAL at 22:32

## 2019-02-15 ASSESSMENT — COGNITIVE AND FUNCTIONAL STATUS - GENERAL
WALKING IN HOSPITAL ROOM: A LITTLE
HELP NEEDED FOR BATHING: A LITTLE
DRESSING REGULAR UPPER BODY CLOTHING: A LITTLE
PERSONAL GROOMING: A LITTLE
TOILETING: A LITTLE
MOVING FROM LYING ON BACK TO SITTING ON SIDE OF FLAT BED: A LITTLE
CLIMB 3 TO 5 STEPS WITH RAILING: A LITTLE
MOBILITY SCORE: 19
SUGGESTED CMS G CODE MODIFIER MOBILITY: CK
STANDING UP FROM CHAIR USING ARMS: A LITTLE
MOVING TO AND FROM BED TO CHAIR: A LITTLE
DRESSING REGULAR LOWER BODY CLOTHING: A LOT
DAILY ACTIVITIY SCORE: 18
SUGGESTED CMS G CODE MODIFIER DAILY ACTIVITY: CK

## 2019-02-15 ASSESSMENT — GAIT ASSESSMENTS
DEVIATION: OTHER (COMMENT)
ASSISTIVE DEVICE: FRONT WHEEL WALKER
GAIT LEVEL OF ASSIST: CONTACT GUARD ASSIST
DISTANCE (FEET): 200

## 2019-02-15 NOTE — PROGRESS NOTES
Aaox4.pleasant.on dilaudid PCA for pain.bp getting stable.hemovac seem not maintaining compression.patient able to sleep.continuing hourly rounding.

## 2019-02-15 NOTE — THERAPY
"Occupational Therapy Evaluation completed.   Functional Status:  Pt presents to skilled OT services following L2-S1 fusion, TLSO when oob >5 mins, pt cleared to sit up eob w/o TLSO [id number]. Pt performed bed mobility with cga, seated eob with F balance, performed seated oral care with sba after s/u. Pt demonstrates decreased endurance, pain, impaired balance, generalized strength deficits, c/o decreased sensation of RLE; however was able to differentiate between touch and pinch, and localize the touch w/o visual input. Pt will benefit from acute skilled OT services, motivated to participate in session. Will continue to assess for safe d/c disposition pending pt's oob mobility once TLSO arrives.   Plan of Care: Will benefit from Occupational Therapy 4 times per week  Discharge Recommendations:  Equipment: Will Continue to Assess for Equipment Needs.     See \"Rehab Therapy-Acute\" Patient Summary Report for complete documentation.    "

## 2019-02-15 NOTE — PROGRESS NOTES
Neurosurgery Progress Note    Subjective:  Pt in bed states that she is better than yesterday but still has alot of back pain and spasm especially after she jumped oob early this morning to help her roommate who fell onto the floor. She disconnected her hvac during that time and it is now just dangling from the bed, the other portion is under the dressing. She indicates ongoing numbness affecting her rle, but better than yesterday. She wants to get oob, dc hale if able and she states that she feels her b/p will come up once she is active.    Exam:  AAO, cooperative, lle- 5/5, right df/pf trace weakness, incision with drsg, hvac- 320cc total    BP  Min: 86/58  Max: 111/75  Pulse  Av.3  Min: 61  Max: 74  Resp  Av  Min: 12  Max: 18  Temp  Av.9 °C (98.5 °F)  Min: 36.1 °C (97 °F)  Max: 37.7 °C (99.9 °F)  SpO2  Av.5 %  Min: 98 %  Max: 100 %    No Data Recorded    Recent Labs      19   0353  19   0815  02/15/19   0236   WBC  5.9  4.7*  5.9   RBC  2.63*  2.54*  2.44*   HEMOGLOBIN  8.9*  8.5*  8.1*   HEMATOCRIT  26.9*  25.7*  24.4*   MCV  101.1*  101.2*  100.0*   MCH  33.5*  33.5*  33.2*   MCHC  33.1*  33.1*  33.2*   RDW  45.1  44.2  43.2   PLATELETCT  153*  129*  113*   MPV  9.2  8.7*  9.2     Recent Labs      19   0353  19   0815  02/15/19   0236   SODIUM  137  136  138   POTASSIUM  3.6  3.5*  4.1   CHLORIDE  106  106  108   CO2  26  25  24   GLUCOSE  113*  95  177*   BUN  12  10  7*   CREATININE  0.59  0.60  0.55   CALCIUM  8.1*  7.5*  8.7               Intake/Output       19 07 - 02/15/19 0659 02/15/19 07 - 19 0659       Total  Total       Intake    P.O.  690  240 930  --  -- --    P.O. 690 240 930 -- -- --    I.V.  --  1200 1200  --  -- --    Volume (mL) (0.9 % NaCl with KCl 20 mEq infusion) -- 1200 1200 -- -- --    IV Piggyback  1000  -- 1000  --  -- --    Volume (mL) (NS (BOLUS) infusion 500 mL) 1000 -- 1000 -- -- --     Total Intake 1690 1440 3130 -- -- --       Output    Urine  3850  1500 5350  --  -- --    Output (mL) (Urethral Catheter Latex 16 Fr.) 3850 1500 5350 -- -- --    Drains  300  20 320  --  -- --    Output (mL) (Closed/Suction Drain Posterior Back Hemovac) 300 20 320 -- -- --    Total Output 4150 1520 5670 -- -- --       Net I/O     -2460 -80 -2540 -- -- --            Intake/Output Summary (Last 24 hours) at 02/15/19 0900  Last data filed at 02/15/19 0600   Gross per 24 hour   Intake             1930 ml   Output             4470 ml   Net            -2540 ml            • HYDROmorphone   Continuous    And   • Pharmacy Consult Request  1 Each PRN   • artificial tear ointment  1 Application Q8HRS   • dexamethasone  4 mg Q6HRS   • gabapentin  600 mg 4X/DAY   • hydroCHLOROthiazide  25 mg DAILY   • levETIRAcetam  1,000 mg BID   • metoprolol  25 mg BID   • tizanidine  4 mg Q8HRS PRN   • MD ALERT...DO NOT ADMINISTER NSAIDS or ASPIRIN unless ORDERED By Neurosurgery  1 Each PRN   • docusate sodium  100 mg BID   • senna-docusate  1 Tab Nightly   • senna-docusate  1 Tab Q24HRS PRN   • polyethylene glycol/lytes  1 Packet BID PRN   • magnesium hydroxide  30 mL QDAY PRN   • bisacodyl  10 mg Q24HRS PRN   • fleet  1 Each Once PRN   • 0.9 % NaCl with KCl 20 mEq 1,000 mL   Continuous   • diphenhydrAMINE  25 mg Q6HRS PRN    Or   • diphenhydrAMINE  25 mg Q6HRS PRN   • ondansetron  4 mg Q4HRS PRN   • ondansetron  4 mg Q4HRS PRN   • promethazine  12.5-25 mg Q4HRS PRN   • promethazine  12.5-25 mg Q4HRS PRN   • prochlorperazine  5-10 mg Q4HRS PRN   • benzocaine-menthol  1 Lozenge Q2HRS PRN   • artificial tears  1 Drop Q2HRS PRN   • Respiratory Care per Protocol   Continuous RT       Assessment and Plan:  POD # 2 L2-4, L5-S1 extension of fusion  Prophylactic anticoagulation: no         Start date/time: n/a  NM as above  Plan to increase activity today - brace on when oob for > 10 minutes  Ok to robyn hale if able to get oob ok  I spoke to RN and  asked that she get a new hvac drain and replace after the portion attached to her has been cleansed as sterilly as able. If it is not holding compression then dc altogether.  Transition onto oral Dilaudid & dc pca & possibly ivf depending on b/p  HH in am

## 2019-02-15 NOTE — CARE PLAN
Problem: Safety  Goal: Will remain free from injury    Intervention: Provide assistance with mobility  Call light within reach, treaded socks in place, bed in lowest position and locked.  Hourly rounding in progress.       Problem: Pain Management  Goal: Pain level will decrease to patient's comfort goal  PCA infusing

## 2019-02-15 NOTE — THERAPY
"Occupational Therapy Treatment completed with focus on ADLs, ADL transfers and patient education.  Functional Status:  Pt seen for OT tx. SBA supine > sit, mod A to don brace. Pt able to adjust and place brace but required assistance for adjusting straps. Able to cross legs in supine for adjusting socks. Discussed possible DME/AE needed for home to increase independence in LB dressing while maintaining spinal precautions. CGA sit > stand, SBA amb w/ FWW. Pt c/o feeling \"woozy\" and irritation in R eye. Demonstrated ability to complete toilet transfer w/ CGA. Pt pleasant and cooperative.   Plan of Care: Will benefit from Occupational Therapy 4 times per week  Discharge Recommendations:  Equipment Will Continue to Assess for Equipment Needs.     See \"Rehab Therapy-Acute\" Patient Summary Report for complete documentation.   "

## 2019-02-15 NOTE — PROGRESS NOTES
Assumed pt care at 0715.  Received report from patrick RN.  Assessment completed.  Pt AAOx4.  Pt complains of 7/10 pain.  PCA infusing.  Najera and hemovac present.  No other s/s of discomfort or distress. Pt ambulates with 1 assist.  Bed in lowest position, locked, and bed alarm is on.  Pt calls appropriately.  Treaded socks in place, call light within reach and staff numbers provided.  Pt needs met at this time.

## 2019-02-16 LAB
ERYTHROCYTE [DISTWIDTH] IN BLOOD BY AUTOMATED COUNT: 42.7 FL (ref 35.9–50)
HCT VFR BLD AUTO: 23.4 % (ref 37–47)
HGB BLD-MCNC: 8 G/DL (ref 12–16)
MCH RBC QN AUTO: 33.3 PG (ref 27–33)
MCHC RBC AUTO-ENTMCNC: 34.2 G/DL (ref 33.6–35)
MCV RBC AUTO: 97.5 FL (ref 81.4–97.8)
PLATELET # BLD AUTO: 122 K/UL (ref 164–446)
PMV BLD AUTO: 9.6 FL (ref 9–12.9)
RBC # BLD AUTO: 2.4 M/UL (ref 4.2–5.4)
WBC # BLD AUTO: 6.9 K/UL (ref 4.8–10.8)

## 2019-02-16 PROCEDURE — A9270 NON-COVERED ITEM OR SERVICE: HCPCS | Performed by: NEUROLOGICAL SURGERY

## 2019-02-16 PROCEDURE — 770006 HCHG ROOM/CARE - MED/SURG/GYN SEMI*

## 2019-02-16 PROCEDURE — 97116 GAIT TRAINING THERAPY: CPT

## 2019-02-16 PROCEDURE — 85027 COMPLETE CBC AUTOMATED: CPT

## 2019-02-16 PROCEDURE — 97530 THERAPEUTIC ACTIVITIES: CPT

## 2019-02-16 PROCEDURE — 700112 HCHG RX REV CODE 229: Performed by: NURSE PRACTITIONER

## 2019-02-16 PROCEDURE — 700102 HCHG RX REV CODE 250 W/ 637 OVERRIDE(OP): Performed by: NEUROLOGICAL SURGERY

## 2019-02-16 PROCEDURE — 700102 HCHG RX REV CODE 250 W/ 637 OVERRIDE(OP): Performed by: NURSE PRACTITIONER

## 2019-02-16 PROCEDURE — A9270 NON-COVERED ITEM OR SERVICE: HCPCS | Performed by: NURSE PRACTITIONER

## 2019-02-16 PROCEDURE — 700111 HCHG RX REV CODE 636 W/ 250 OVERRIDE (IP): Performed by: NURSE PRACTITIONER

## 2019-02-16 PROCEDURE — 36415 COLL VENOUS BLD VENIPUNCTURE: CPT

## 2019-02-16 RX ADMIN — DEXAMETHASONE 4 MG: 4 TABLET ORAL at 23:33

## 2019-02-16 RX ADMIN — HYDROMORPHONE HYDROCHLORIDE 1 MG: 1 INJECTION, SOLUTION INTRAMUSCULAR; INTRAVENOUS; SUBCUTANEOUS at 17:58

## 2019-02-16 RX ADMIN — MINERAL OIL AND WHITE PETROLATUM 1 APPLICATION: 150; 830 OINTMENT OPHTHALMIC at 04:52

## 2019-02-16 RX ADMIN — FAMOTIDINE 20 MG: 20 TABLET ORAL at 04:52

## 2019-02-16 RX ADMIN — METOPROLOL TARTRATE 25 MG: 25 TABLET, FILM COATED ORAL at 17:58

## 2019-02-16 RX ADMIN — MINERAL OIL AND WHITE PETROLATUM 1 APPLICATION: 150; 830 OINTMENT OPHTHALMIC at 21:35

## 2019-02-16 RX ADMIN — DEXAMETHASONE 4 MG: 4 TABLET ORAL at 17:58

## 2019-02-16 RX ADMIN — HYDROMORPHONE HYDROCHLORIDE 4 MG: 2 TABLET ORAL at 20:01

## 2019-02-16 RX ADMIN — LEVETIRACETAM 1000 MG: 500 TABLET ORAL at 04:52

## 2019-02-16 RX ADMIN — HYDROMORPHONE HYDROCHLORIDE 1 MG: 1 INJECTION, SOLUTION INTRAMUSCULAR; INTRAVENOUS; SUBCUTANEOUS at 02:08

## 2019-02-16 RX ADMIN — HYDROMORPHONE HYDROCHLORIDE 1 MG: 1 INJECTION, SOLUTION INTRAMUSCULAR; INTRAVENOUS; SUBCUTANEOUS at 23:43

## 2019-02-16 RX ADMIN — HYDROMORPHONE HYDROCHLORIDE 2 MG: 2 TABLET ORAL at 04:52

## 2019-02-16 RX ADMIN — DEXAMETHASONE 4 MG: 4 TABLET ORAL at 12:32

## 2019-02-16 RX ADMIN — DOCUSATE SODIUM 100 MG: 100 CAPSULE, LIQUID FILLED ORAL at 04:52

## 2019-02-16 RX ADMIN — GABAPENTIN 600 MG: 300 CAPSULE ORAL at 12:32

## 2019-02-16 RX ADMIN — GABAPENTIN 600 MG: 300 CAPSULE ORAL at 08:47

## 2019-02-16 RX ADMIN — HYDROMORPHONE HYDROCHLORIDE 4 MG: 2 TABLET ORAL at 11:17

## 2019-02-16 RX ADMIN — HYDROMORPHONE HYDROCHLORIDE 1 MG: 1 INJECTION, SOLUTION INTRAMUSCULAR; INTRAVENOUS; SUBCUTANEOUS at 14:01

## 2019-02-16 RX ADMIN — GABAPENTIN 600 MG: 300 CAPSULE ORAL at 21:35

## 2019-02-16 RX ADMIN — HYDROMORPHONE HYDROCHLORIDE 4 MG: 2 TABLET ORAL at 15:43

## 2019-02-16 RX ADMIN — SENNOSIDES AND DOCUSATE SODIUM 1 TABLET: 8.6; 5 TABLET ORAL at 21:35

## 2019-02-16 RX ADMIN — GABAPENTIN 600 MG: 300 CAPSULE ORAL at 17:57

## 2019-02-16 RX ADMIN — HYDROMORPHONE HYDROCHLORIDE 1 MG: 1 INJECTION, SOLUTION INTRAMUSCULAR; INTRAVENOUS; SUBCUTANEOUS at 07:35

## 2019-02-16 RX ADMIN — POLYETHYLENE GLYCOL 3350 1 PACKET: 17 POWDER, FOR SOLUTION ORAL at 15:43

## 2019-02-16 RX ADMIN — TIZANIDINE 2 MG: 4 TABLET ORAL at 23:48

## 2019-02-16 RX ADMIN — DEXAMETHASONE 4 MG: 4 TABLET ORAL at 04:52

## 2019-02-16 RX ADMIN — FAMOTIDINE 20 MG: 20 TABLET ORAL at 17:57

## 2019-02-16 RX ADMIN — LEVETIRACETAM 1000 MG: 500 TABLET ORAL at 17:57

## 2019-02-16 RX ADMIN — MINERAL OIL AND WHITE PETROLATUM 1 APPLICATION: 150; 830 OINTMENT OPHTHALMIC at 14:01

## 2019-02-16 RX ADMIN — DOCUSATE SODIUM 100 MG: 100 CAPSULE, LIQUID FILLED ORAL at 17:57

## 2019-02-16 ASSESSMENT — COGNITIVE AND FUNCTIONAL STATUS - GENERAL
MOVING FROM LYING ON BACK TO SITTING ON SIDE OF FLAT BED: A LITTLE
MOVING TO AND FROM BED TO CHAIR: A LITTLE
STANDING UP FROM CHAIR USING ARMS: A LITTLE
SUGGESTED CMS G CODE MODIFIER MOBILITY: CK
MOBILITY SCORE: 19
CLIMB 3 TO 5 STEPS WITH RAILING: A LITTLE
WALKING IN HOSPITAL ROOM: A LITTLE

## 2019-02-16 ASSESSMENT — GAIT ASSESSMENTS
GAIT LEVEL OF ASSIST: STAND BY ASSIST
ASSISTIVE DEVICE: FRONT WHEEL WALKER
DISTANCE (FEET): 300

## 2019-02-16 NOTE — CARE PLAN
Problem: Pain Management  Goal: Pain level will decrease to patient's comfort goal  Outcome: PROGRESSING AS EXPECTED  Pt receiving 2 mg hydromorphone every 4 hours as needed for pain control. Pt also has an order for 1 mg IV hydromorphone every 3 hours for breakthrough pain. Will continue to monitor and assess pain and administer PRN medications as ordered.

## 2019-02-16 NOTE — THERAPY
"Physical Therapy Treatment completed.   Bed Mobility:  Supine to Sit: Stand by Assist  Transfers: Sit to Stand: Contact Guard Assist  Gait: Level Of Assist: Contact Guard Assist with Front-Wheel Walker       Plan of Care: Will benefit from Physical Therapy 4 times per week  Discharge Recommendations: Equipment: Front-Wheel Walker.     See \"Rehab Therapy-Acute\" Patient Summary Report for complete documentation.     Pt progressing well w/ therapy. Pt was able to perform most mobility at a SBA-CGA level. She was able to recall spinal precautions however did require intermittent cues to follow throughout mobility. Pt was able to ambulate 200 feet. She does require cues for heel strike on the R. Pt educated on and HEP to assist w/ weakness in LE during gait. Pt was stating that she had to walk w/ her R eye closed as she states that it has been bothering her since after surgery. Pt stating she will have a lot of help at home. Pt will benefit from outpatient physical therapy once cleared by MD.  "

## 2019-02-16 NOTE — THERAPY
"Physical Therapy Treatment completed.   Bed Mobility:  Supine to Sit: Stand by Assist  Transfers: Sit to Stand: Stand by Assist  Gait: Level Of Assist: Stand by Assist with Front-Wheel Walker       Plan of Care: Will benefit from Physical Therapy 4 times per week  Discharge Recommendations: Equipment: No Equipment Needed.   See \"Rehab Therapy-Acute\" Patient Summary Report for complete documentation.     Pt pleasant and motivated to participate in therapy session. Pt able to complete 5 stairs with unilateral hand rail and no LOB. Educated on proper sequencing and step to gait pattern. Pt able to ambulated with fww and SBA and no lob. Pt seemed very cautious and able to cite spinal precautions and maintained throughout the tx session. Pt would benefit from outpatient physical therapy once cleared by md. Pt reports she has a lot of support at home.   "

## 2019-02-16 NOTE — CARE PLAN
Problem: Bowel/Gastric:  Goal: Normal bowel function is maintained or improved    Intervention: Educate patient and significant other/support system about diet, fluid intake, medications and activity to promote bowel function  Pt has hypoactive bowel sounds and hasn't had a BM in a few days; PRN Miralax will be given and pt ambulating halls      Problem: Pain Management  Goal: Pain level will decrease to patient's comfort goal  Outcome: PROGRESSING AS EXPECTED  Pt still requiring PRN Dilaudid and breakthrough IV dilaudid; relaxation techniques taught to distract from pain

## 2019-02-16 NOTE — CARE PLAN
Problem: Safety  Goal: Will remain free from injury  Outcome: PROGRESSING AS EXPECTED    Intervention: Provide assistance with mobility  Pt ambulates with little difficulty while using a front-wheeled walker and with staff as standby assistants. Pt is compliant with TLSO brace on while out of bed. Will continue to monitor.

## 2019-02-16 NOTE — PROGRESS NOTES
Assumed care of pt at 0700.  Pt alert and oriented x4.  C/o chronic sharp and radiating pain to her right groin/back/leg.  PRN PO and IV Dilaudid given throughout shift with good effect.  Pt able to ambulate halls with FWW.  Hypoactive bowel sounds noted; PRN Miralax given.  Bed low and locked, call bell within reach.  Hourly rounding continues.

## 2019-02-16 NOTE — CONSULTS
Ophthalmology Consult  Pt underwent multi-level spinal fusion surgery 2 d ago.  Pt reports bilateral FBS and Photophobia since.  These symptoms are improving.  Pt feels OD is worse than OS.  POH neg  Exam  Va OD 20/50  OS 20/30 NsC  Pupils 5->3 OU no APD, normal reactivity OU  Ext wnl, EOMs wnl  Ant segment  C/S-quiet / white OU  K - mild SPK (w/ flouress) OD>OS  AC D/Q  I-wnl  L 2-3+ NSC/CSC OD>OS    I: Mild Bilateral Keratitis-improving    Cataracts  P: continue lacrilube ointment QID PRN symptoms      F/u with Ophtho on d/c home    ROSEMARY Angeles MD

## 2019-02-16 NOTE — PROGRESS NOTES
Neurosurgery Progress Note    Subjective:  Feeling better today. Has breakthru right mi pain with radiation into the anterior thigh. Eyes are a little irritated, but improved. Appreciate Dr. Angeles consult.     Exam:  Up and walking with walker.   Incision ok   Strength appears full      BP  Min: 97/57  Max: 149/99  Pulse  Av.4  Min: 55  Max: 79  Resp  Av  Min: 16  Max: 20  Temp  Av.4 °C (97.6 °F)  Min: 36.2 °C (97.2 °F)  Max: 36.6 °C (97.9 °F)  SpO2  Av %  Min: 94 %  Max: 98 %    No Data Recorded    Recent Labs      19   0815  02/15/19   0236  19   0253   WBC  4.7*  5.9  6.9   RBC  2.54*  2.44*  2.40*   HEMOGLOBIN  8.5*  8.1*  8.0*   HEMATOCRIT  25.7*  24.4*  23.4*   MCV  101.2*  100.0*  97.5   MCH  33.5*  33.2*  33.3*   MCHC  33.1*  33.2*  34.2   RDW  44.2  43.2  42.7   PLATELETCT  129*  113*  122*   MPV  8.7*  9.2  9.6     Recent Labs      19   0353  19   0815  02/15/19   0236   SODIUM  137  136  138   POTASSIUM  3.6  3.5*  4.1   CHLORIDE  106  106  108   CO2  26  25  24   GLUCOSE  113*  95  177*   BUN  12  10  7*   CREATININE  0.59  0.60  0.55   CALCIUM  8.1*  7.5*  8.7               Intake/Output       02/15/19 07 - 19 0659 19 07 - 19 0659       Total  Total       Intake    P.O.  --  240 240  --  -- --    P.O. -- 240 240 -- -- --    I.V.  103.7  -- 103.7  --  -- --    PCA End of Shift Total Volume (ml) 103.7 -- 103.7 -- -- --    Total Intake 103.7 240 343.7 -- -- --       Output    Urine  1750  -- 1750  --  -- --    Number of Times Voided -- 3 x 3 x -- -- --    Output (mL) ([REMOVED] Urethral Catheter Latex 16 Fr.) 1750 -- 1750 -- -- --    Total Output 1750 -- 1750 -- -- --       Net I/O     -1646.3 240 -1406.3 -- -- --            Intake/Output Summary (Last 24 hours) at 19 0846  Last data filed at 19 0400   Gross per 24 hour   Intake            343.7 ml   Output             1750 ml   Net           -1406.3 ml            • tizanidine  2 mg Q8HRS PRN   • HYDROmorphone  2-4 mg Q4HRS PRN   • HYDROmorphone  1 mg Q3HRS PRN   • famotidine  20 mg BID   • calcium carbonate  500 mg Q4HRS PRN   • Pharmacy Consult Request  1 Each PRN   • artificial tear ointment  1 Application Q8HRS   • dexamethasone  4 mg Q6HRS   • gabapentin  600 mg 4X/DAY   • hydroCHLOROthiazide  25 mg DAILY   • levETIRAcetam  1,000 mg BID   • metoprolol  25 mg BID   • MD ALERT...DO NOT ADMINISTER NSAIDS or ASPIRIN unless ORDERED By Neurosurgery  1 Each PRN   • docusate sodium  100 mg BID   • senna-docusate  1 Tab Nightly   • senna-docusate  1 Tab Q24HRS PRN   • polyethylene glycol/lytes  1 Packet BID PRN   • magnesium hydroxide  30 mL QDAY PRN   • bisacodyl  10 mg Q24HRS PRN   • fleet  1 Each Once PRN   • 0.9 % NaCl with KCl 20 mEq 1,000 mL   Continuous   • diphenhydrAMINE  25 mg Q6HRS PRN    Or   • diphenhydrAMINE  25 mg Q6HRS PRN   • ondansetron  4 mg Q4HRS PRN   • ondansetron  4 mg Q4HRS PRN   • promethazine  12.5-25 mg Q4HRS PRN   • promethazine  12.5-25 mg Q4HRS PRN   • prochlorperazine  5-10 mg Q4HRS PRN   • benzocaine-menthol  1 Lozenge Q2HRS PRN   • artificial tears  1 Drop Q2HRS PRN   • Respiratory Care per Protocol   Continuous RT       Assessment and Plan:  Hospital day #3  POD #3  Prophylactic anticoagulation:no      Start date/time: no    Doing better.   Weaning pain meds.  Toleration hgb 8    Will follow and possibly home tomorrow.

## 2019-02-16 NOTE — PROGRESS NOTES
Pt is alert and oriented x 4. Pt with persistent lower back pain from her recent fusion surgery. Pt's dressing is dry and intact with good coverage and no noted drainage. Pt did take a partial bed bath and then was able to ambulate around the unit two times with a front-wheeled walker and with the TLSO brace on (per orders). Pt did have one instance of breakthrough pain after ambulating, this was covered with PRN IV dilaudid 1 mg as ordered. Pt's pain being covered with 2 mg dilaudid PO every 4 hours as needed otherwise. Pt with no other complaints of distress at this time, will continue to monitor and assess.

## 2019-02-17 PROCEDURE — 700102 HCHG RX REV CODE 250 W/ 637 OVERRIDE(OP): Performed by: NEUROLOGICAL SURGERY

## 2019-02-17 PROCEDURE — 700111 HCHG RX REV CODE 636 W/ 250 OVERRIDE (IP): Performed by: NURSE PRACTITIONER

## 2019-02-17 PROCEDURE — 700102 HCHG RX REV CODE 250 W/ 637 OVERRIDE(OP): Performed by: NURSE PRACTITIONER

## 2019-02-17 PROCEDURE — A9270 NON-COVERED ITEM OR SERVICE: HCPCS | Performed by: NEUROLOGICAL SURGERY

## 2019-02-17 PROCEDURE — A9270 NON-COVERED ITEM OR SERVICE: HCPCS | Performed by: NURSE PRACTITIONER

## 2019-02-17 PROCEDURE — 700112 HCHG RX REV CODE 229: Performed by: NURSE PRACTITIONER

## 2019-02-17 PROCEDURE — 770006 HCHG ROOM/CARE - MED/SURG/GYN SEMI*

## 2019-02-17 RX ORDER — GABAPENTIN 300 MG/1
300 CAPSULE ORAL
Status: DISCONTINUED | OUTPATIENT
Start: 2019-02-17 | End: 2019-02-18 | Stop reason: HOSPADM

## 2019-02-17 RX ORDER — BACLOFEN 10 MG/1
10 TABLET ORAL EVERY 8 HOURS PRN
Status: DISCONTINUED | OUTPATIENT
Start: 2019-02-17 | End: 2019-02-18 | Stop reason: HOSPADM

## 2019-02-17 RX ORDER — BACLOFEN 10 MG/1
10 TABLET ORAL
Status: DISCONTINUED | OUTPATIENT
Start: 2019-02-17 | End: 2019-02-17

## 2019-02-17 RX ADMIN — DEXAMETHASONE 4 MG: 4 TABLET ORAL at 07:14

## 2019-02-17 RX ADMIN — HYDROMORPHONE HYDROCHLORIDE 1 MG: 1 INJECTION, SOLUTION INTRAMUSCULAR; INTRAVENOUS; SUBCUTANEOUS at 05:33

## 2019-02-17 RX ADMIN — FAMOTIDINE 20 MG: 20 TABLET ORAL at 05:43

## 2019-02-17 RX ADMIN — DEXAMETHASONE 4 MG: 4 TABLET ORAL at 17:50

## 2019-02-17 RX ADMIN — BISACODYL 10 MG: 10 SUPPOSITORY RECTAL at 16:27

## 2019-02-17 RX ADMIN — FAMOTIDINE 20 MG: 20 TABLET ORAL at 17:48

## 2019-02-17 RX ADMIN — DOCUSATE SODIUM 100 MG: 100 CAPSULE, LIQUID FILLED ORAL at 05:42

## 2019-02-17 RX ADMIN — HYDROMORPHONE HYDROCHLORIDE 1 MG: 1 INJECTION, SOLUTION INTRAMUSCULAR; INTRAVENOUS; SUBCUTANEOUS at 20:49

## 2019-02-17 RX ADMIN — HYDROMORPHONE HYDROCHLORIDE 4 MG: 2 TABLET ORAL at 11:16

## 2019-02-17 RX ADMIN — BACLOFEN 10 MG: 10 TABLET ORAL at 16:23

## 2019-02-17 RX ADMIN — DOCUSATE SODIUM 100 MG: 100 CAPSULE, LIQUID FILLED ORAL at 17:48

## 2019-02-17 RX ADMIN — SENNOSIDES AND DOCUSATE SODIUM 1 TABLET: 8.6; 5 TABLET ORAL at 20:56

## 2019-02-17 RX ADMIN — HYDROCHLOROTHIAZIDE 25 MG: 25 TABLET ORAL at 05:44

## 2019-02-17 RX ADMIN — GABAPENTIN 600 MG: 300 CAPSULE ORAL at 12:02

## 2019-02-17 RX ADMIN — MAGNESIUM HYDROXIDE 30 ML: 400 SUSPENSION ORAL at 11:16

## 2019-02-17 RX ADMIN — LEVETIRACETAM 1000 MG: 500 TABLET ORAL at 05:44

## 2019-02-17 RX ADMIN — LEVETIRACETAM 1000 MG: 500 TABLET ORAL at 17:48

## 2019-02-17 RX ADMIN — DEXAMETHASONE 4 MG: 4 TABLET ORAL at 12:02

## 2019-02-17 RX ADMIN — HYDROMORPHONE HYDROCHLORIDE 4 MG: 2 TABLET ORAL at 17:50

## 2019-02-17 RX ADMIN — HYDROMORPHONE HYDROCHLORIDE 4 MG: 2 TABLET ORAL at 00:49

## 2019-02-17 RX ADMIN — GABAPENTIN 300 MG: 300 CAPSULE ORAL at 20:55

## 2019-02-17 RX ADMIN — GABAPENTIN 600 MG: 300 CAPSULE ORAL at 03:43

## 2019-02-17 RX ADMIN — GABAPENTIN 600 MG: 300 CAPSULE ORAL at 20:56

## 2019-02-17 RX ADMIN — GABAPENTIN 600 MG: 300 CAPSULE ORAL at 17:48

## 2019-02-17 RX ADMIN — HYDROMORPHONE HYDROCHLORIDE 1 MG: 1 INJECTION, SOLUTION INTRAMUSCULAR; INTRAVENOUS; SUBCUTANEOUS at 13:41

## 2019-02-17 NOTE — PROGRESS NOTES
Pt states she went to [facility] for surgery w/ a white plastic hospital bag w/ her grey shoes, white sweater, and blue jeans. These items were placed in a locker but did not make it back w/ the patient or her .

## 2019-02-17 NOTE — PROGRESS NOTES
Neurosurgery Progress Note    Subjective:  Pt in bed states that she is still experiencing spasms in her back, right sided that requires that she take breakthrough medication. She reports improvement with her eyes. She is getting oob some    Exam:  AAO, cooperative, lle- 5/5, right df/pf trace weakness, incision with drsg    BP  Min: 120/66  Max: 132/84  Pulse  Av.8  Min: 50  Max: 62  Resp  Av  Min: 16  Max: 18  Temp  Av.6 °C (97.8 °F)  Min: 36.1 °C (97 °F)  Max: 37.2 °C (98.9 °F)  SpO2  Av %  Min: 96 %  Max: 98 %    No Data Recorded    Recent Labs      02/15/19   0236  19   0253   WBC  5.9  6.9   RBC  2.44*  2.40*   HEMOGLOBIN  8.1*  8.0*   HEMATOCRIT  24.4*  23.4*   MCV  100.0*  97.5   MCH  33.2*  33.3*   MCHC  33.2*  34.2   RDW  43.2  42.7   PLATELETCT  113*  122*   MPV  9.2  9.6     Recent Labs      02/15/19   0236   SODIUM  138   POTASSIUM  4.1   CHLORIDE  108   CO2  24   GLUCOSE  177*   BUN  7*   CREATININE  0.55   CALCIUM  8.7               Intake/Output       19 0700 - 19 0659 19 07 - 19 0659      4213-3877 8557-1330 Total 0364-4345 6728-2429 Total       Intake    P.O.  120  -- 120  120  -- 120    P.O. 120 -- 120 120 -- 120    Total Intake 120 -- 120 120 -- 120       Output    Urine  --  -- --  --  -- --    Number of Times Voided 1 x 2 x 3 x -- -- --    Total Output -- -- -- -- -- --       Net I/O     120 -- 120 120 -- 120            Intake/Output Summary (Last 24 hours) at 19 0948  Last data filed at 19 0750   Gross per 24 hour   Intake              120 ml   Output                0 ml   Net              120 ml            • tizanidine  2 mg Q8HRS PRN   • HYDROmorphone  2-4 mg Q4HRS PRN   • HYDROmorphone  1 mg Q3HRS PRN   • famotidine  20 mg BID   • calcium carbonate  500 mg Q4HRS PRN   • Pharmacy Consult Request  1 Each PRN   • artificial tear ointment  1 Application Q8HRS   • dexamethasone  4 mg Q6HRS   • gabapentin  600 mg 4X/DAY   •  hydroCHLOROthiazide  25 mg DAILY   • levETIRAcetam  1,000 mg BID   • metoprolol  25 mg BID   • MD ALERT...DO NOT ADMINISTER NSAIDS or ASPIRIN unless ORDERED By Neurosurgery  1 Each PRN   • docusate sodium  100 mg BID   • senna-docusate  1 Tab Nightly   • senna-docusate  1 Tab Q24HRS PRN   • polyethylene glycol/lytes  1 Packet BID PRN   • magnesium hydroxide  30 mL QDAY PRN   • bisacodyl  10 mg Q24HRS PRN   • fleet  1 Each Once PRN   • diphenhydrAMINE  25 mg Q6HRS PRN    Or   • diphenhydrAMINE  25 mg Q6HRS PRN   • ondansetron  4 mg Q4HRS PRN   • ondansetron  4 mg Q4HRS PRN   • promethazine  12.5-25 mg Q4HRS PRN   • promethazine  12.5-25 mg Q4HRS PRN   • prochlorperazine  5-10 mg Q4HRS PRN   • benzocaine-menthol  1 Lozenge Q2HRS PRN   • artificial tears  1 Drop Q2HRS PRN   • Respiratory Care per Protocol   Continuous RT       Assessment and Plan:  POD # 4 L2-4, L5-S1 extension of fusion  Prophylactic anticoagulation: no         Start date/time: n/a  NM as above  Continue to increase activity today - brace on when oob for > 10 minutes  Continue with pain control  DC home in next 1-2 days if her pain is controlled with pills only

## 2019-02-17 NOTE — PROGRESS NOTES
Assumed care of pt at 0700.  Pt alert and oriented x4.  She is still c/o pain (that she says has increased from yesterday), to her right groin/hip/leg area.  Pt still requesting IV Dilaudid for breakthrough pain, between PO doses.  Relaxation techniques and distraction provided but pt not extremely receptive.  Hypoactive bowel sounds continue to be noted; she has not had a BM since 2/12 so PRN Milk of Mag will be administered.  Pt ambulating halls ad elsa with TLSO brace in place.  Bed low and locked, call bell within reach.  Hourly rounding continues.    1345: PAT Claros with Neurosurgery called since pt stated she takes 900mg Gabapentin qhs, and she was wondering if she could get Baclofen PRN.  Hyacinth gave me verbal order to place these orders; see MAR and order history for details.    1620:  Pt requested suppository, even though she received milk of mag this morning.  She has not had any luck having a BM so decided to take the suppository now.

## 2019-02-17 NOTE — CARE PLAN
Problem: Safety  Goal: Will remain free from injury  Outcome: PROGRESSING AS EXPECTED  Bed low and locked; pt uses call bell appropriately; room near RN station    Problem: Pain Management  Goal: Pain level will decrease to patient's comfort goal  Outcome: PROGRESSING SLOWER THAN EXPECTED  Pt still c/o breakthrough pain in between PO Dilaudid doses; requesting IV Dilaudid.  Relaxation techniques and distraction attempted but pt adamant about taking IV Dilaudid.  Will update MD when makes rounds

## 2019-02-17 NOTE — PROGRESS NOTES
AOx4, pt seen ambulating hallways at beginning of shift, also ambulated 2x more during shift. Pain present in lower back, medicated per MAR. C/o of neuropathy at 0345 and moved 0900 dose of gabapentin 600mg to 0400. TLSO brace when up, FWW in use. Regular diet. Call light in reach.

## 2019-02-18 ENCOUNTER — PATIENT OUTREACH (OUTPATIENT)
Dept: HEALTH INFORMATION MANAGEMENT | Facility: OTHER | Age: 59
End: 2019-02-18

## 2019-02-18 VITALS
BODY MASS INDEX: 24.27 KG/M2 | HEIGHT: 66 IN | SYSTOLIC BLOOD PRESSURE: 123 MMHG | HEART RATE: 60 BPM | DIASTOLIC BLOOD PRESSURE: 78 MMHG | RESPIRATION RATE: 14 BRPM | WEIGHT: 151.01 LBS | OXYGEN SATURATION: 97 % | TEMPERATURE: 97.3 F

## 2019-02-18 PROCEDURE — 97535 SELF CARE MNGMENT TRAINING: CPT

## 2019-02-18 PROCEDURE — 700111 HCHG RX REV CODE 636 W/ 250 OVERRIDE (IP): Performed by: NURSE PRACTITIONER

## 2019-02-18 PROCEDURE — A9270 NON-COVERED ITEM OR SERVICE: HCPCS | Performed by: NURSE PRACTITIONER

## 2019-02-18 PROCEDURE — 700102 HCHG RX REV CODE 250 W/ 637 OVERRIDE(OP): Performed by: NEUROLOGICAL SURGERY

## 2019-02-18 PROCEDURE — A9270 NON-COVERED ITEM OR SERVICE: HCPCS | Performed by: NEUROLOGICAL SURGERY

## 2019-02-18 PROCEDURE — 700102 HCHG RX REV CODE 250 W/ 637 OVERRIDE(OP): Performed by: NURSE PRACTITIONER

## 2019-02-18 PROCEDURE — 700112 HCHG RX REV CODE 229: Performed by: NURSE PRACTITIONER

## 2019-02-18 RX ORDER — BACLOFEN 10 MG/1
10 TABLET ORAL EVERY 8 HOURS PRN
Qty: 30 TAB | Refills: 0 | Status: SHIPPED | OUTPATIENT
Start: 2019-02-18

## 2019-02-18 RX ORDER — METHYLPREDNISOLONE 4 MG/1
TABLET ORAL
Qty: 1 KIT | Refills: 0 | Status: SHIPPED | OUTPATIENT
Start: 2019-02-18

## 2019-02-18 RX ORDER — HYDROMORPHONE HYDROCHLORIDE 2 MG/1
4 TABLET ORAL EVERY 4 HOURS PRN
Qty: 42 TAB | Refills: 0 | Status: SHIPPED | OUTPATIENT
Start: 2019-02-18 | End: 2019-02-18

## 2019-02-18 RX ORDER — GABAPENTIN 300 MG/1
300 CAPSULE ORAL ONCE
Status: COMPLETED | OUTPATIENT
Start: 2019-02-18 | End: 2019-02-18

## 2019-02-18 RX ORDER — BISACODYL 10 MG
10 SUPPOSITORY, RECTAL RECTAL
Refills: 0 | COMMUNITY
Start: 2019-02-18

## 2019-02-18 RX ORDER — PSEUDOEPHEDRINE HCL 30 MG
100 TABLET ORAL 2 TIMES DAILY
Qty: 60 CAP | Refills: 0 | Status: SHIPPED | OUTPATIENT
Start: 2019-02-18

## 2019-02-18 RX ORDER — HYDROMORPHONE HYDROCHLORIDE 4 MG/1
4 TABLET ORAL EVERY 4 HOURS PRN
Qty: 42 TAB | Refills: 0 | Status: SHIPPED | OUTPATIENT
Start: 2019-02-18 | End: 2019-02-25

## 2019-02-18 RX ADMIN — BACLOFEN 10 MG: 10 TABLET ORAL at 12:07

## 2019-02-18 RX ADMIN — DOCUSATE SODIUM 100 MG: 100 CAPSULE, LIQUID FILLED ORAL at 05:00

## 2019-02-18 RX ADMIN — HYDROMORPHONE HYDROCHLORIDE 4 MG: 2 TABLET ORAL at 01:20

## 2019-02-18 RX ADMIN — DEXAMETHASONE 4 MG: 4 TABLET ORAL at 01:20

## 2019-02-18 RX ADMIN — BACLOFEN 10 MG: 10 TABLET ORAL at 04:11

## 2019-02-18 RX ADMIN — HYDROMORPHONE HYDROCHLORIDE 1 MG: 1 INJECTION, SOLUTION INTRAMUSCULAR; INTRAVENOUS; SUBCUTANEOUS at 04:55

## 2019-02-18 RX ADMIN — HYDROMORPHONE HYDROCHLORIDE 4 MG: 2 TABLET ORAL at 14:36

## 2019-02-18 RX ADMIN — POLYETHYLENE GLYCOL 3350 1 PACKET: 17 POWDER, FOR SOLUTION ORAL at 08:56

## 2019-02-18 RX ADMIN — FAMOTIDINE 20 MG: 20 TABLET ORAL at 04:59

## 2019-02-18 RX ADMIN — DEXAMETHASONE 4 MG: 4 TABLET ORAL at 05:02

## 2019-02-18 RX ADMIN — MINERAL OIL AND WHITE PETROLATUM 1 APPLICATION: 150; 830 OINTMENT OPHTHALMIC at 01:21

## 2019-02-18 RX ADMIN — GABAPENTIN 300 MG: 300 CAPSULE ORAL at 12:30

## 2019-02-18 RX ADMIN — GABAPENTIN 600 MG: 300 CAPSULE ORAL at 04:59

## 2019-02-18 RX ADMIN — HYDROMORPHONE HYDROCHLORIDE 4 MG: 2 TABLET ORAL at 08:56

## 2019-02-18 RX ADMIN — LEVETIRACETAM 1000 MG: 500 TABLET ORAL at 04:59

## 2019-02-18 RX ADMIN — DEXAMETHASONE 4 MG: 4 TABLET ORAL at 12:08

## 2019-02-18 RX ADMIN — GABAPENTIN 600 MG: 300 CAPSULE ORAL at 12:13

## 2019-02-18 ASSESSMENT — COGNITIVE AND FUNCTIONAL STATUS - GENERAL
PERSONAL GROOMING: A LITTLE
DRESSING REGULAR UPPER BODY CLOTHING: A LITTLE
SUGGESTED CMS G CODE MODIFIER DAILY ACTIVITY: CK
DRESSING REGULAR LOWER BODY CLOTHING: A LOT
HELP NEEDED FOR BATHING: A LITTLE
TOILETING: A LITTLE
DAILY ACTIVITIY SCORE: 18

## 2019-02-18 NOTE — THERAPY
"Occupational Therapy Treatment completed with focus on ADLs and ADL transfers.  Functional Status:  Pt supine to sit w/ supervision. Sit to stand w/ supervision. Pt don/doff TLSO w/ supervision. Required verbal cue to bring front piece to overlap back piece. Pt ambulated in room/hallway w/ fww. Pt completed toilet txf and toileting w/ supervision. Pt states her spouse and adult son will be home to assist w/ ADLs and ADL txfs as needed. Pt returned back to supine post session w/ supervision.    Plan of Care: Will benefit from Occupational Therapy 4 times per week  Discharge Recommendations:  Equipment Will Continue to Assess for Equipment Needs.     See \"Rehab Therapy-Acute\" Patient Summary Report for complete documentation.   "

## 2019-02-18 NOTE — PROGRESS NOTES
Pt discharged per MD orders.  IV removed.  Belongings gathered/returned to patient.  Discharge instructions, medications and follow up appointments reviewed with patient and her .  They deny any further questions.

## 2019-02-18 NOTE — PROGRESS NOTES
Neurosurgery Progress Note    Subjective:  Pt awake  Overall doing better  Still with intermittent severe rt sided pain  Wants to go home  Voiding  + bm      Exam:  AAO, cooperative  LE str 5/5  Inc c/d/i with yogi     BP  Min: 104/74  Max: 130/79  Pulse  Av.5  Min: 50  Max: 60  Resp  Avg: 15.5  Min: 14  Max: 16  Temp  Av.3 °C (97.4 °F)  Min: 36.1 °C (97 °F)  Max: 36.7 °C (98.1 °F)  SpO2  Av.8 %  Min: 94 %  Max: 99 %    No Data Recorded    Recent Labs      19   0253   WBC  6.9   RBC  2.40*   HEMOGLOBIN  8.0*   HEMATOCRIT  23.4*   MCV  97.5   MCH  33.3*   MCHC  34.2   RDW  42.7   PLATELETCT  122*   MPV  9.6                   Intake/Output       19 0700 - 19 0659 19 0700 - 19 0659      [id number] 5829-8562 Total 9246-6526 2954-3992 Total       Intake    P.O.  480  -- 480  240  -- 240    P.O. 480 -- 480 240 -- 240    Total Intake 480 -- 480 240 -- 240       Output    Urine  --  -- --  --  -- --    Number of Times Voided -- 1 x 1 x 1 x -- 1 x    Stool  --  -- --  --  -- --    Number of Times Stooled 1 x -- 1 x 1 x -- 1 x    Total Output -- -- -- -- -- --       Net I/O     480 -- 480 240 -- 240            Intake/Output Summary (Last 24 hours) at 19 1154  Last data filed at 19 0856   Gross per 24 hour   Intake              480 ml   Output                0 ml   Net              480 ml            • gabapentin  300 mg QHS   • baclofen  10 mg Q8HRS PRN   • HYDROmorphone  2-4 mg Q4HRS PRN   • HYDROmorphone  1 mg Q3HRS PRN   • famotidine  20 mg BID   • calcium carbonate  500 mg Q4HRS PRN   • Pharmacy Consult Request  1 Each PRN   • artificial tear ointment  1 Application Q8HRS   • dexamethasone  4 mg Q6HRS   • gabapentin  600 mg 4X/DAY   • hydroCHLOROthiazide  25 mg DAILY   • levETIRAcetam  1,000 mg BID   • metoprolol  25 mg BID   • MD ALERT...DO NOT ADMINISTER NSAIDS or ASPIRIN unless ORDERED By Neurosurgery  1 Each PRN   • docusate sodium  100 mg BID   •  senna-docusate  1 Tab Nightly   • senna-docusate  1 Tab Q24HRS PRN   • polyethylene glycol/lytes  1 Packet BID PRN   • magnesium hydroxide  30 mL QDAY PRN   • bisacodyl  10 mg Q24HRS PRN   • fleet  1 Each Once PRN   • diphenhydrAMINE  25 mg Q6HRS PRN    Or   • diphenhydrAMINE  25 mg Q6HRS PRN   • ondansetron  4 mg Q4HRS PRN   • ondansetron  4 mg Q4HRS PRN   • promethazine  12.5-25 mg Q4HRS PRN   • promethazine  12.5-25 mg Q4HRS PRN   • prochlorperazine  5-10 mg Q4HRS PRN   • benzocaine-menthol  1 Lozenge Q2HRS PRN   • artificial tears  1 Drop Q2HRS PRN   • Respiratory Care per Protocol   Continuous RT       Assessment and Plan:  POD # 5 L2-4, L5-S1 extension of fusion  Prophylactic anticoagulation: no         Start date/time: n/a  Cont pain regimen  TLSO when oob  FWW  Home today

## 2019-02-18 NOTE — CARE PLAN
Problem: Safety  Goal: Will remain free from injury  Outcome: PROGRESSING AS EXPECTED  Reviewed patient's mobility status, discussed with care team of patient needs, verifying appropriate safety precautions in place, providing patient education, ensuring call lights are within reach, non-slip socks in use, evaluating needs alarms & monitoring every shift, continuing with current plan of care.      Problem: Knowledge Deficit  Goal: Knowledge of the prescribed therapeutic regimen will improve  Outcome: PROGRESSING AS EXPECTED  Educated patient about POC, activities, encouraging patient to ask questions, providing answers to patient's questions, educating patient about medications, encouraging patient involvement in care process. Continuing with current POC.

## 2019-02-18 NOTE — CARE PLAN
"Problem: Safety  Goal: Will remain free from injury  Outcome: PROGRESSING AS EXPECTED  Bed low and locked, call bell within reach.  Pt up ad elsa but educated to use call bell if she needs assistance.  Room near RN station    Problem: Pain Management  Goal: Pain level will decrease to patient's comfort goal  Outcome: PROGRESSING AS EXPECTED  Pt not needing PRN medications as much as the last few days; she has been encouraged not to ask \"when they are due\" and instead listen to the actual pain and then alert RN, and will give if she can have them.  This way she is not using them like scheduled meds      "

## 2019-02-18 NOTE — DISCHARGE INSTRUCTIONS
Discharge Instructions    Discharged to home by car with relative. Discharged via wheelchair, hospital escort: Yes.  Special equipment needed: TLSO and Walker    Be sure to schedule a follow-up appointment with your primary care doctor or any specialists as instructed.     Discharge Plan:   Diet Plan: Discussed  Activity Level: Discussed  Confirmed Follow up Appointment: Appointment Scheduled  Confirmed Symptoms Management: Discussed  Medication Reconciliation Updated: Yes  Influenza Vaccine Indication: Not indicated: Previously immunized this influenza season and > 8 years of age    I understand that a diet low in cholesterol, fat, and sodium is recommended for good health. Unless I have been given specific instructions below for another diet, I accept this instruction as my diet prescription.   Other diet: Regular    Special Instructions:    Follow up with [facility] at [facility] in 2 weeks [phone]   Follow up with Dr. Carter in 6 weeks   No pushing, pulling, lifting greater than 10 pounds   No repetitive bending, no twisting   Ok to shower, pat incision dry - 24 hours after drain was removed   No non-steroidal anti-inflammatory medications or aspirin until cleared by Dr. Carter   Ambulate as much is comfortable   No driving for at least 2 weeks following surgery or until cleared   Obtain over the counter senekot take 1-2 tablets daily while taking narcotic pain medication   Do not return to work until cleared by physician   Wear brace at all times when out of bed, may shower without brace.    · Is patient discharged on Warfarin / Coumadin? No    Hydromorphone tablets  What is this medicine?  HYDROMORPHONE (aba droe MOR fone) is a pain reliever. It is used to treat moderate to severe pain.  This medicine may be used for other purposes; ask your health care provider or pharmacist if you have questions.  COMMON BRAND NAME(S): Ninfa  What should I tell my health care provider before I take this  medicine?  They need to know if you have any of these conditions:  -brain tumor  -drug abuse or addiction  -head injury  -heart disease  -if you often drink alcohol  -kidney disease  -liver disease  -lung or breathing disease, like asthma  -problems urinating  -seizures  -stomach or intestine problems  -an unusual or allergic reaction to hydromorphone, other medicines, foods, dyes, or preservatives  -pregnant or trying to get pregnant  -breast-feeding  How should I use this medicine?  Take this medicine by mouth with a glass of water. Follow the directions on the prescription label. You can take this medicine with or without food. If it upsets your stomach, take it with food. Take your medicine at regular intervals. Do not take it more often than directed. Do not stop taking except on your doctor's advice.  A special MedGuide will be given to you by the pharmacist with each prescription and refill. Be sure to read this information carefully each time.  Talk to your pediatrician regarding the use of this medicine in children. Special care may be needed.  Overdosage: If you think you have taken too much of this medicine contact a poison control center or emergency room at once.  NOTE: This medicine is only for you. Do not share this medicine with others.  What if I miss a dose?  If you miss a dose, take it as soon as you can. If it is almost time for your next dose, take only that dose. Do not take double or extra doses.  What may interact with this medicine?  This medicine may interact with the following medications:  -alcohol  -antihistamines for allergy, cough and cold  -certain medicines for anxiety or sleep  -certain medicines for depression like amitriptyline, fluoxetine, sertraline  -certain medicines for seizures like phenobarbital, primidone  -general anesthetics like halothane, isoflurane, methoxyflurane, propofol  -local anesthetics like lidocaine, pramoxine, tetracaine  -MAOIs like Carbex, Eldepryl,  Marplan, Nardil, and Parnate  -medicines that relax muscles for surgery  -other narcotic medicines for pain or cough  -phenothiazines like chlorpromazine, mesoridazine, prochlorperazine, thioridazine  This list may not describe all possible interactions. Give your health care provider a list of all the medicines, herbs, non-prescription drugs, or dietary supplements you use. Also tell them if you smoke, drink alcohol, or use illegal drugs. Some items may interact with your medicine.  What should I watch for while using this medicine?  Tell your doctor or health care professional if your pain does not go away, if it gets worse, or if you have new or a different type of pain. You may develop tolerance to the medicine. Tolerance means that you will need a higher dose of the medicine for pain relief. Tolerance is normal and is expected if you take this medicine for a long time.  Do not suddenly stop taking your medicine because you may develop a severe reaction. Your body becomes used to the medicine. This does NOT mean you are addicted. Addiction is a behavior related to getting and using a drug for a non-medical reason. If you have pain, you have a medical reason to take pain medicine. Your doctor will tell you how much medicine to take. If your doctor wants you to stop the medicine, the dose will be slowly lowered over time to avoid any side effects.  There are different types of narcotic medicines (opiates). If you take more than one type at the same time or if you are taking another medicine that also causes drowsiness, you may have more side effects. Give your health care provider a list of all medicines you use. Your doctor will tell you how much medicine to take. Do not take more medicine than directed. Call emergency for help if you have problems breathing or unusual sleepiness.  You may get drowsy or dizzy. Do not drive, use machinery, or do anything that needs mental alertness until you know how this medicine  affects you. Do not stand or sit up quickly, especially if you are an older patient. This reduces the risk of dizzy or fainting spells. Alcohol may interfere with the effect of this medicine. Avoid alcoholic drinks.  This medicine will cause constipation. Try to have a bowel movement at least every 2 to 3 days. If you do not have a bowel movement for 3 days, call your doctor or health care professional.  Your mouth may get dry. Chewing sugarless gum or sucking hard candy, and drinking plenty of water may help. Contact your doctor if the problem does not go away or is severe.  What side effects may I notice from receiving this medicine?  Side effects that you should report to your doctor or health care professional as soon as possible:  -allergic reactions like skin rash, itching or hives, swelling of the face, lips, or tongue  -breathing problems  -confusion  -seizures  -signs and symptoms of low blood pressure like dizziness; feeling faint or lightheaded, falls; unusually weak or tired  -trouble passing urine or change in the amount of urine  Side effects that usually do not require medical attention (report to your doctor or health care professional if they continue or are bothersome):  -constipation  -dry mouth  -nausea, vomiting  -tiredness  This list may not describe all possible side effects. Call your doctor for medical advice about side effects. You may report side effects to FDA at 5-178-FDA-8535.  Where should I keep my medicine?  Keep out of the reach of children. This medicine can be abused. Keep your medicine in a safe place to protect it from theft. Do not share this medicine with anyone. Selling or giving away this medicine is dangerous and against the law.  Store at room temperature between 15 and 30 degrees C (59 and 86 degrees F). Keep container tightly closed. Protect from light.  This medicine may cause accidental overdose and death if it is taken by other adults, children, or pets. Flush any  unused medicine down the toilet to reduce the chance of harm. Do not use the medicine after the expiration date.  NOTE: This sheet is a summary. It may not cover all possible information. If you have questions about this medicine, talk to your doctor, pharmacist, or health care provider.  © 2018 Elsevier/Gold Standard (2016-09-09 13:24:56)  Back Surgery  Back problems bother many people in all age groups regardless of sex or occupation. There are many things you can do to take care of your back. It is best for you to manage your back problems rather than your back problems managing you. Back surgery can be a valuable aid for recovery if all other conservative means (exercise, anti-inflammatory medications, therapeutic bed, and physical therapy) have failed. The majority of back problems do respond to conservative therapy. You are having surgery because your back problem has not responded to conservative treatment.  DIAGNOSIS AND TREATMENT  Successful treatment begins with evaluation and learning what is wrong (diagnosis). Your caregiver will perform a thorough medical evaluation consisting of history, a physical exam, and diagnostic tests. These often will include: X-rays, MRI, or CT (special forms of X-rays). X-rays help locate the problem area. EMGs (electromyogram) are sometimes done to determine if the nerves involved are still working properly. Blood work may be done. Treatment options include conservative management (non-surgical) or surgery.  SYMPTOMS  · Loss of range of motion or pain with range of motion.   · Pain shooting down into the legs (sciatica).   · Numbness or weakness in your legs.   · Trouble controlling bladder or bowels (these are usually very late stage problems).   ANATOMY OF THE BACK  The more you know about your back, the better job you will be able to do in taking care of it. There are 24 bones in your back (vertebrae) and the cartilage cushioning (discs) between them are arranged in three  natural curves: the neck (cervical), upper back (thoracic), and lower back (lumbar) curves. When these curves are in their normal (good posture) alignment, your body is in a balanced position, distributing the weight evenly on your back. This posture is best maintained with strong and exercised back muscles. Activities which disrupt this alignment such as too much forward bending (flexion), or backward arching (extension), put extra pressure on the spine. As a result, the vertebrae and discs wear out (degenerate) faster than normal, contributing to bulging or broken (ruptured) discs, arthritis, and instability.  ANATOMY OF THE LUMBAR CURVE (LOWER BACK)  The lumbar curve consists of five back bones (vertebrae) and their discs (the cartilage structure and its fibrous surrounding). The spinal cord passes through the spinal canal formed by the bony arch (lamina) on the back of the vertebral bodies. Nerve roots leading to the legs branch out from the spinal cord. Each emerges through a side opening formed between vertebrae (the foramen) that are next to each other. When a piece of disc material ruptures out of its normal position and presses on a nerve root (ruptured disc) it creates the severe low back pain associated with this. The spinal canal and foramina, which are passageways for the nerves, vary in size from person to person. People with smaller passageways (stenosis or narrowing) are at higher risk for nerve irritation (inflammation) and back and leg pain. Although ruptured discs can occur any place in the back; they most frequently occur in the lower back (lumbar) area.  PREPARING FOR SURGERY  · Stop smoking at least one week prior to surgery. This lowers risk during surgery.   · Your caregiver may advise that you stop taking certain medications that may affect the outcome of the surgery and your ability to heal. For example, you may need to stop taking anti-inflammatories, such as aspirin, because of possible  bleeding problems. Other medications may have interactions with anesthesia.   · BE SURE TO LET YOUR CAREGIVER KNOW IF YOU HAVE BEEN ON STEROIDS (INCLUDING CREAMS) FOR LONG PERIODS OF TIME. THIS IS CRITICAL.   · Your caregiver will discuss possible risks and complications with you before surgery. In addition to the usual risks of anesthesia, other common risks and complications include: blood loss and replacement; temporary increase in pain due to surgery; uncorrected back pain; infection; and new nerve damage (tingling, numbness, and pain).   LET YOUR CAREGIVER KNOW ABOUT:  · Allergies.   · Medications taken including herbs, eye drops, over-the-counter medications, and creams.   · Use of steroids (by mouth or creams).   · Previous problems with anesthetics or numbing medication.   · Possibility of pregnancy, if this applies.   · History of blood clots (thrombophlebitis).   · History of bleeding or blood problems.   · Previous surgery.   · Other health problems.   BEFORE THE PROCEDURE  You should be present 60 minutes prior to your procedure or as directed.   AFTER THE PROCEDURE  After surgery, you will be taken to the recovery area where a nurse will watch and check your progress. Once you are awake, stable, and taking fluids well, barring other problems, you will be allowed to go home.  HOME CARE INSTRUCTIONS   · Once home, an ice pack applied to your operative site may help with discomfort and keep the swelling down.   · Follow your caregiver's instructions as to activities, exercises, physical therapy, and driving a car.   · Weight reduction may be beneficial.   · Daily exercise is helpful to prevent return of problems. Maintain strength and range of motion as instructed.   · Only take over-the-counter or prescription medicines for pain, discomfort, or fever as directed by your caregiver.   SEEK IMMEDIATE MEDICAL CARE IF:   · There is increased bleeding (more than a small spot) from the wound.   · You notice  redness, swelling, or increasing pain in the wound.   · Pus is coming from wound.   · An unexplained oral temperature above 102° F (38.9° C) develops.   · You notice a foul smell coming from the wound or dressing.   · You develop a rash.   · You have difficulty breathing.   · Have any allergic problems.   Document Released: 03/26/2002 Document Revised: 03/11/2013 Document Reviewed: 07/20/2009  ExitCare® Patient Information ©2013 Where I've Been.  Depression / Suicide Risk    As you are discharged from this [facility] facility, it is important to learn how to keep safe from harming yourself.    Recognize the warning signs:  · Abrupt changes in personality, positive or negative- including increase in energy   · Giving away possessions  · Change in eating patterns- significant weight changes-  positive or negative  · Change in sleeping patterns- unable to sleep or sleeping all the time   · Unwillingness or inability to communicate  · Depression  · Unusual sadness, discouragement and loneliness  · Talk of wanting to die  · Neglect of personal appearance   · Rebelliousness- reckless behavior  · Withdrawal from people/activities they love  · Confusion- inability to concentrate     If you or a loved one observes any of these behaviors or has concerns about self-harm, here's what you can do:  · Talk about it- your feelings and reasons for harming yourself  · Remove any means that you might use to hurt yourself (examples: pills, rope, extension cords, firearm)  · Get professional help from the community (Mental Health, Substance Abuse, psychological counseling)  · Do not be alone:Call your Safe Contact- someone whom you trust who will be there for you.  · Call your local CRISIS HOTLINE 667-6865 or 091-270-8732  · Call your local Children's Mobile Crisis Response Team [place] (560) 385-0548 or www.[employer] [place]  · Call the toll free National Suicide Prevention Hotlines   · National Suicide Prevention Lifeline  375-524-QBJN (6211)  · National [place] Network 800-SUICIDE (900-7407)

## 2019-02-18 NOTE — PROGRESS NOTES
Assumed care of pt at 0700.  Pt alert and oriented x4.  She still c/o significant back pain, but was educated not to be using her PRN meds like scheduled meds.  She was educated that she has been okay without PRN meds for hours at a time, but that whenever RN does pain assessment, she asks if they are 'due.'  These meds are not 'due' like scheduled meds.  Pt receptive of education but continues to need the PRN meds upon RN pain assessment vs her calling RN when pain comes back.  Staples are intact.  Pt had a small BM this morning.  Bed low and locked, call bell within reach.  Hourly rounding continues.

## 2019-02-19 NOTE — DISCHARGE SUMMARY
DATE OF ADMISSION:  02/13/2019    DATE OF DISCHARGE:  02/18/2019    ADMITTING DIAGNOSIS:  Recurrent lumbar stenosis right L4-L5, lumbar stenosis   L2-L4, degenerative disk disease L5-S1 and spondylolisthesis at L3-L4.    COURSE OF HOSPITALIZATION:  The patient was taken to the operating room on   date of admission, she underwent bilateral L3 and L5 facetectomy, left L2   facetectomy, L2 laminectomy, L4 decompression L3-14 TLIF, L5-S1 TLIF and   posterior lateral fusion L2-S1 with Dr. Shawn Carter.    Postoperatively, the patient has been working on pain control and mobility.    She is having intermittent severe right-sided low back and side pain that   radiates down her anterior leg.  She is at a point where she feels her oral   pain regimen is tolerable enough where she can go home.  She wants to go home   today.  She does not have any radicular pain currently.  Her strength is   intact.  Her incision is clean, dry, and intact with staples.  She is   ambulatory.  She has a front wheel walker ready for home.  She has a custom   TLSO brace that needs to be worn when out of bed.  Her vital signs and lab   values are stable and she was set up for home on postoperative day #5.    DISCHARGE MEDICATIONS:  1.  Dilaudid 4 mg 1 p.o. every 4 hours p.r.n. severe pain, #42, no refills.  2.  Medrol Dosepak, take 1 pack as directed.  3.  Baclofen 10 mg p.o. t.i.d. p.r.n. spasms, #30, no refills.  4.  Stool softeners, Colace 100 mg p.o. b.i.d., #60, no refills.  5.  Substance abuse history has been reviewed through [facility], no aberrant   behaviors identified.    DISCHARGE INSTRUCTIONS:  Patient was given standard postoperative   instructions.  She will follow up with Dr. Carter and team at 2 and 6 weeks   postoperatively.  If she has any further questions or concerns, she should not   hesitate to contact our office.       ____________________________________     VIKI SHEARER / TAN    DD:  02/18/2019  14:31:14  DT:  02/18/2019 16:01:14    D#:  [id number]  Job#:  [id number]

## 2019-02-26 NOTE — ADDENDUM NOTE
Encounter addended by: Emma Bullock R.N. on: 2/26/2019  1:18 PM<BR>    Actions taken: Flowsheet accepted, Utilization Review saved

## 2019-03-25 ENCOUNTER — HOSPITAL ENCOUNTER (OUTPATIENT)
Dept: RADIOLOGY | Facility: MEDICAL CENTER | Age: 59
End: 2019-03-25
Attending: NURSE PRACTITIONER
Payer: COMMERCIAL

## 2019-03-25 DIAGNOSIS — M51.36 DEGENERATION, INTERVERTEBRAL DISC, LUMBAR: ICD-10-CM

## 2019-03-25 PROCEDURE — 72100 X-RAY EXAM L-S SPINE 2/3 VWS: CPT

## 2019-04-26 NOTE — PROGRESS NOTES
Neurosurgery Progress Note    Subjective:  Pt in bed, about to get brace fitted, seen by Dr. Carter earlier, states right groin/leg pain worse than preop, also has right eye pain, possible scratch, low bp overnight, pca stopped and restarted, +hale    Exam:  AAO, cooperative, lle- 5/5, right df/pf trace weakness, incision with drsg, hvac- 200ml    BP  Min: 85/56  Max: 130/86  Pulse  Av.3  Min: 64  Max: 122  Resp  Avg: 15.2  Min: 12  Max: 20  Temp  Av.9 °C (98.4 °F)  Min: 36.2 °C (97.2 °F)  Max: 37.6 °C (99.6 °F)  SpO2  Av.9 %  Min: 96 %  Max: 100 %    No Data Recorded    Recent Labs      19   0815   WBC  5.9  4.7*   RBC  2.63*  2.54*   HEMOGLOBIN  8.9*  8.5*   HEMATOCRIT  26.9*  25.7*   MCV  101.1*  101.2*   MCH  33.5*  33.5*   MCHC  33.1*  33.1*   RDW  45.1  44.2   PLATELETCT  153*  129*   MPV  9.2  8.7*     Recent Labs      19   0353  02/14/19   0815   SODIUM  137  136   POTASSIUM  3.6  3.5*   CHLORIDE  106  106   CO2  26  25   GLUCOSE  113*  95   BUN  12  10   CREATININE  0.59  0.60   CALCIUM  8.1*  7.5*               Intake/Output       19 07 - 19 0659 19 07 - 02/15/19 0659       Total  Total       Intake    P.O.  --  -- --  200  -- 200    P.O. -- -- -- 200 -- 200    I.V.    --   500  -- 500    Crystalloid Intake 2000 -- -- --    Volume (mL) (0.9 % NaCl with KCl 20 mEq infusion) -- -- -- 500 -- 500    Blood  220  -- 220  --  -- --    Cell Saver Volume (mL) 220 -- 220 -- -- --    Total Intake 2220 -- 2220 700 -- 700       Output    Urine  150  120 270  1000  -- 1000    Urine Void (mL) -- 120 120 -- -- --    Output (mL) (Urethral Catheter Latex 16 Fr.) 150 -- 150 1000 -- 1000    Drains  --  485 485  200  -- 200    Output (mL) (Closed/Suction Drain Posterior Back Hemovac) -- 485 485 200 -- 200    Blood  500  -- 500  --  -- --    Est. Blood Loss (mL) 500 -- 500 -- -- --    Total Output 650 605  1255 1200 -- 1200       Net I/O     1570 -605 965 -500 -- -500            Intake/Output Summary (Last 24 hours) at 02/14/19 0943  Last data filed at 02/14/19 0900   Gross per 24 hour   Intake             2920 ml   Output             2455 ml   Net              465 ml            • HYDROmorphone   Continuous    And   • Pharmacy Consult Request  1 Each PRN   • artificial tear ointment  1 Application Q8HRS   • dexamethasone  4 mg Q6HRS   • gabapentin  600 mg 4X/DAY   • hydroCHLOROthiazide  25 mg DAILY   • levETIRAcetam  1,000 mg BID   • metoprolol  25 mg BID   • tizanidine  4 mg Q8HRS PRN   • MD ALERT...DO NOT ADMINISTER NSAIDS or ASPIRIN unless ORDERED By Neurosurgery  1 Each PRN   • docusate sodium  100 mg BID   • senna-docusate  1 Tab Nightly   • senna-docusate  1 Tab Q24HRS PRN   • polyethylene glycol/lytes  1 Packet BID PRN   • magnesium hydroxide  30 mL QDAY PRN   • bisacodyl  10 mg Q24HRS PRN   • fleet  1 Each Once PRN   • 0.9 % NaCl with KCl 20 mEq 1,000 mL   Continuous   • diphenhydrAMINE  25 mg Q6HRS PRN    Or   • diphenhydrAMINE  25 mg Q6HRS PRN   • ondansetron  4 mg Q4HRS PRN   • ondansetron  4 mg Q4HRS PRN   • promethazine  12.5-25 mg Q4HRS PRN   • promethazine  12.5-25 mg Q4HRS PRN   • prochlorperazine  5-10 mg Q4HRS PRN   • benzocaine-menthol  1 Lozenge Q2HRS PRN   • artificial tears  1 Drop Q2HRS PRN   • Respiratory Care per Protocol   Continuous RT       Assessment and Plan:  POD #1 s/p L2-4, L5-S1 extension of fusion  Prophylactic anticoagulation: no         Start date/time: n/a  NM as above  Resume pca, added steroids  Fluid bolus given for BP  Monitor labs, BP  Continue IVF  PT/OT when brace arrives  Ok to sit up for up to 10 min prior to brace arrival  Keep hale  Monitor drain  Ointment for eye      Improved

## 2019-05-24 ENCOUNTER — HOSPITAL ENCOUNTER (OUTPATIENT)
Dept: RADIOLOGY | Facility: MEDICAL CENTER | Age: 59
End: 2019-05-24
Attending: NURSE PRACTITIONER
Payer: COMMERCIAL

## 2019-05-24 DIAGNOSIS — M41.86 OTHER FORMS OF SCOLIOSIS, LUMBAR REGION: ICD-10-CM

## 2019-05-24 PROCEDURE — 72110 X-RAY EXAM L-2 SPINE 4/>VWS: CPT

## 2020-07-22 NOTE — TELEPHONE ENCOUNTER
Received request via: Pharmacy    Was the patient seen in the last year in this department? MANI  11/8/18  Does the patient have an active prescription (recently filled or refills available) for medication(s) requested? No

## 2020-10-02 ENCOUNTER — HOSPITAL ENCOUNTER (OUTPATIENT)
Dept: RADIOLOGY | Facility: MEDICAL CENTER | Age: 60
End: 2020-10-02
Attending: ANESTHESIOLOGY
Payer: COMMERCIAL

## 2020-10-02 DIAGNOSIS — M47.896 OTHER SPONDYLOSIS, LUMBAR REGION: ICD-10-CM

## 2020-10-02 PROCEDURE — 72110 X-RAY EXAM L-2 SPINE 4/>VWS: CPT

## 2020-10-03 ENCOUNTER — HOSPITAL ENCOUNTER (OUTPATIENT)
Facility: MEDICAL CENTER | Age: 60
End: 2020-10-03
Attending: PHYSICIAN ASSISTANT
Payer: COMMERCIAL

## 2020-10-03 ENCOUNTER — OFFICE VISIT (OUTPATIENT)
Dept: URGENT CARE | Facility: CLINIC | Age: 60
End: 2020-10-03
Payer: COMMERCIAL

## 2020-10-03 VITALS
BODY MASS INDEX: 27.32 KG/M2 | HEIGHT: 66 IN | HEART RATE: 82 BPM | SYSTOLIC BLOOD PRESSURE: 120 MMHG | DIASTOLIC BLOOD PRESSURE: 90 MMHG | OXYGEN SATURATION: 95 % | TEMPERATURE: 97.3 F | WEIGHT: 170 LBS

## 2020-10-03 DIAGNOSIS — N30.00 ACUTE CYSTITIS WITHOUT HEMATURIA: ICD-10-CM

## 2020-10-03 LAB
APPEARANCE UR: CLEAR
BILIRUB UR STRIP-MCNC: NORMAL MG/DL
COLOR UR AUTO: YELLOW
GLUCOSE UR STRIP.AUTO-MCNC: NORMAL MG/DL
KETONES UR STRIP.AUTO-MCNC: NORMAL MG/DL
LEUKOCYTE ESTERASE UR QL STRIP.AUTO: NORMAL
NITRITE UR QL STRIP.AUTO: NORMAL
PH UR STRIP.AUTO: 6 [PH] (ref 5–8)
PROT UR QL STRIP: 100 MG/DL
RBC UR QL AUTO: NORMAL
SP GR UR STRIP.AUTO: 1.02
UROBILINOGEN UR STRIP-MCNC: 1 MG/DL

## 2020-10-03 PROCEDURE — 87077 CULTURE AEROBIC IDENTIFY: CPT

## 2020-10-03 PROCEDURE — 81002 URINALYSIS NONAUTO W/O SCOPE: CPT | Performed by: PHYSICIAN ASSISTANT

## 2020-10-03 PROCEDURE — 87186 SC STD MICRODIL/AGAR DIL: CPT

## 2020-10-03 PROCEDURE — 87086 URINE CULTURE/COLONY COUNT: CPT

## 2020-10-03 PROCEDURE — 99214 OFFICE O/P EST MOD 30 MIN: CPT | Mod: 25 | Performed by: PHYSICIAN ASSISTANT

## 2020-10-03 RX ORDER — GABAPENTIN 300 MG/1
300 CAPSULE ORAL
COMMUNITY

## 2020-10-03 RX ORDER — PHENAZOPYRIDINE HYDROCHLORIDE 100 MG/1
100 TABLET, FILM COATED ORAL 3 TIMES DAILY PRN
Qty: 6 TAB | Refills: 0 | Status: SHIPPED | OUTPATIENT
Start: 2020-10-03

## 2020-10-03 RX ORDER — HYDROCODONE BITARTRATE AND ACETAMINOPHEN 7.5; 325 MG/1; MG/1
TABLET ORAL
COMMUNITY

## 2020-10-03 RX ORDER — OMEPRAZOLE 20 MG/1
CAPSULE, DELAYED RELEASE ORAL
COMMUNITY

## 2020-10-03 RX ORDER — ESTRADIOL 0.04 MG/D
PATCH TRANSDERMAL
COMMUNITY

## 2020-10-03 RX ORDER — BUPRENORPHINE 2 MG/1
2 TABLET SUBLINGUAL DAILY
COMMUNITY

## 2020-10-03 RX ORDER — ALBUTEROL SULFATE 90 UG/1
AEROSOL, METERED RESPIRATORY (INHALATION)
COMMUNITY

## 2020-10-03 RX ORDER — PREGABALIN 150 MG/1
CAPSULE ORAL
COMMUNITY

## 2020-10-03 RX ORDER — METHOCARBAMOL 500 MG/1
TABLET, FILM COATED ORAL
COMMUNITY

## 2020-10-03 RX ORDER — DICLOFENAC SODIUM 75 MG/1
75 TABLET, DELAYED RELEASE ORAL
COMMUNITY

## 2020-10-03 RX ORDER — OMEPRAZOLE 40 MG/1
CAPSULE, DELAYED RELEASE ORAL
COMMUNITY

## 2020-10-03 RX ORDER — CLONIDINE HYDROCHLORIDE 0.1 MG/1
0.1 TABLET ORAL 2 TIMES DAILY
COMMUNITY

## 2020-10-03 RX ORDER — TIZANIDINE 4 MG/1
4 TABLET ORAL EVERY 6 HOURS PRN
COMMUNITY

## 2020-10-03 RX ORDER — IBUPROFEN 800 MG/1
800 TABLET ORAL EVERY 8 HOURS PRN
COMMUNITY

## 2020-10-03 RX ORDER — CEPHALEXIN 500 MG/1
CAPSULE ORAL
COMMUNITY

## 2020-10-03 RX ORDER — NITROFURANTOIN 25; 75 MG/1; MG/1
100 CAPSULE ORAL 2 TIMES DAILY
Qty: 10 CAP | Refills: 0 | Status: SHIPPED | OUTPATIENT
Start: 2020-10-03 | End: 2020-10-08

## 2020-10-03 RX ORDER — METOPROLOL SUCCINATE 50 MG/1
TABLET, EXTENDED RELEASE ORAL
COMMUNITY

## 2020-10-03 RX ORDER — PREGABALIN 75 MG/1
CAPSULE ORAL
COMMUNITY

## 2020-10-03 RX ORDER — HYDROCODONE BITARTRATE AND ACETAMINOPHEN 10; 325 MG/1; MG/1
TABLET ORAL
COMMUNITY

## 2020-10-03 RX ORDER — AMITRIPTYLINE HYDROCHLORIDE 25 MG/1
25 TABLET, FILM COATED ORAL NIGHTLY
COMMUNITY

## 2020-10-03 ASSESSMENT — ENCOUNTER SYMPTOMS
SPUTUM PRODUCTION: 0
FLANK PAIN: 0
FEVER: 0
MUSCULOSKELETAL NEGATIVE: 1
CHILLS: 0
NAUSEA: 0
ABDOMINAL PAIN: 0
DIARRHEA: 0
DIZZINESS: 0
SHORTNESS OF BREATH: 0
VOMITING: 0
SORE THROAT: 0

## 2020-10-03 ASSESSMENT — FIBROSIS 4 INDEX: FIB4 SCORE: 3.41

## 2020-10-04 DIAGNOSIS — N30.00 ACUTE CYSTITIS WITHOUT HEMATURIA: ICD-10-CM

## 2020-10-04 NOTE — PROGRESS NOTES
"Subjective:      Anabella Neves is a 60 y.o. female who presents with Dysuria (x2 days. Dysuria, urinary urgency, malodorus urine.)            Dysuria   This is a new problem. The current episode started in the past 7 days (1 week). The problem occurs every urination. The problem has been waxing and waning. The quality of the pain is described as burning. The pain is at a severity of 2/10. The pain is mild. There has been no fever. There is no history of pyelonephritis. Associated symptoms include frequency and urgency. Pertinent negatives include no chills, flank pain, hematuria, nausea, possible pregnancy or vomiting. She has tried nothing for the symptoms. There is no history of catheterization, kidney stones, recurrent UTIs or a urological procedure.     Patient presents to urgent care reporting a 1 week history of dysuria, frequency, and urgency. No fevers, chills, body aches, nausea, vomiting, abdominal pain, flank pain, or hematuria. No history of kidney stones.     Review of Systems   Constitutional: Negative for chills and fever.   HENT: Negative for ear pain and sore throat.    Respiratory: Negative for sputum production and shortness of breath.    Cardiovascular: Negative for chest pain.   Gastrointestinal: Negative for abdominal pain, diarrhea, nausea and vomiting.   Genitourinary: Positive for dysuria, frequency and urgency. Negative for flank pain and hematuria.   Musculoskeletal: Negative.    Neurological: Negative for dizziness.        Objective:     /90   Pulse 82   Temp 36.3 °C (97.3 °F)   Ht 1.676 m (5' 6\")   Wt 77.1 kg (170 lb)   LMP 06/01/2007   SpO2 95%   BMI 27.44 kg/m²      Physical Exam  Vitals signs and nursing note reviewed.   Constitutional:       General: She is not in acute distress.     Appearance: Normal appearance. She is well-developed. She is not diaphoretic.   HENT:      Head: Normocephalic and atraumatic.      Right Ear: External ear normal.      Left Ear: " External ear normal.      Nose: Nose normal.   Eyes:      Pupils: Pupils are equal, round, and reactive to light.   Neck:      Musculoskeletal: Normal range of motion.   Cardiovascular:      Rate and Rhythm: Normal rate.   Pulmonary:      Effort: Pulmonary effort is normal.   Abdominal:      General: Abdomen is flat. Bowel sounds are normal. There is no distension.      Tenderness: There is no abdominal tenderness. There is no right CVA tenderness, left CVA tenderness or guarding.   Musculoskeletal: Normal range of motion.   Skin:     General: Skin is warm and dry.   Neurological:      Mental Status: She is alert and oriented to person, place, and time.   Psychiatric:         Behavior: Behavior normal.          PMH:  has a past medical history of Anesthesia, Dyslipidemia (11/8/2018), Fatigue, GERD (gastroesophageal reflux disease), Heart burn, High cholesterol (01/03/2019), Hypertension, Nasal drainage, Neurosarcoidosis (HCC), Pain, Rhinitis, Ringing in ears, Sarcoidosis, Snoring, Tonsillitis, Urinary incontinence, and Weight loss.  MEDS:   Current Outpatient Medications:   •  Fish Oil-Cholecalciferol (FISH OIL + D3 PO), Fish Oil  2000 tid, Disp: , Rfl:   •  gabapentin (NEURONTIN) 300 MG Cap, 300 mg., Disp: , Rfl:   •  cloNIDine (CATAPRES) 0.1 MG Tab, Take 0.1 mg by mouth 2 times a day., Disp: , Rfl:   •  amitriptyline (ELAVIL) 25 MG Tab, Take 25 mg by mouth every evening., Disp: , Rfl:   •  buprenorphine (SUBUTEX) 2 MG SL Tab, Place 2 mg under tongue every day., Disp: , Rfl:   •  tizanidine (ZANAFLEX) 4 MG Tab, Take 4 mg by mouth every 6 hours as needed., Disp: , Rfl:   •  ibuprofen (MOTRIN) 800 MG Tab, Take 800 mg by mouth every 8 hours as needed., Disp: , Rfl:   •  nitrofurantoin (MACROBID) 100 MG Cap, Take 1 Cap by mouth 2 times a day for 5 days., Disp: 10 Cap, Rfl: 0  •  phenazopyridine (PYRIDIUM) 100 MG Tab, Take 1 Tab by mouth 3 times a day as needed., Disp: 6 Tab, Rfl: 0  •  metoprolol (LOPRESSOR) 25 MG Tab,  TAKE 1 TABLET BY MOUTH TWICE A DAY, Disp: 180 Tab, Rfl: 3  •  Cholecalciferol (VITAMIN D3 PO), Take 1 Cap by mouth 2 Times a Day., Disp: , Rfl:   •  CALCIUM PO, Take 1 Tab by mouth every day., Disp: , Rfl:   •  levETIRAcetam (KEPPRA) 500 MG Tab, Take 1,000 mg by mouth 2 times a day., Disp: , Rfl:   •  gabapentin (NEURONTIN) 600 MG tablet, Take 600 mg by mouth 5 Times a Day., Disp: , Rfl:   •  hydrochlorothiazide (HYDRODIURIL) 25 MG Tab, Take 25 mg by mouth every day., Disp: , Rfl:   •  omeprazole (PRILOSEC) 20 MG delayed-release capsule, omeprazole 20 mg tablet,delayed release  TAKE 1 TABLET BY MOUTH TWICE A DAY, Disp: , Rfl:   •  Cholecalciferol (VITAMIN D3) 25 MCG (1000 UT) Cap, Vitamin D3 25 mcg (1,000 unit) tablet, Disp: , Rfl:   •  albuterol 108 (90 Base) MCG/ACT Aero Soln inhalation aerosol, albuterol sulfate HFA 90 mcg/actuation aerosol inhaler  Inhale 2 puffs every 4 hours by inhalation route., Disp: , Rfl:   •  cephALEXin (KEFLEX) 500 MG Cap, cephalexin 500 mg capsule, Disp: , Rfl:   •  diclofenac DR (VOLTAREN) 75 MG Tablet Delayed Response, 75 mg., Disp: , Rfl:   •  estradiol (CLIMARA) 0.025 MG/24HR PATCH WEEKLY, estradiol 0.025 mg/24 hr weekly transdermal patch  APPLY 1 PATCH TO SKIN EVERY WEEK, Disp: , Rfl:   •  estradiol (CLIMARA) 0.0375 MG/24HR patch, estradiol 0.0375 mg/24 hr weekly transdermal patch  APPLY 1 PATCH TO SKIN ONCE  WEEKLY, Disp: , Rfl:   •  HYDROcodone/acetaminophen (NORCO)  MG Tab, hydrocodone 10 mg-acetaminophen 325 mg tablet   every 6 hours by oral route., Disp: , Rfl:   •  HYDROcodone-acetaminophen (NORCO) 7.5-325 MG per tablet, hydrocodone 7.5 mg-acetaminophen 325 mg tablet, Disp: , Rfl:   •  methocarbamol (ROBAXIN) 500 MG Tab, methocarbamol 500 mg tablet, Disp: , Rfl:   •  omeprazole (PRILOSEC) 40 MG delayed-release capsule, omeprazole 40 mg capsule,delayed release, Disp: , Rfl:   •  pregabalin (LYRICA) 150 MG Cap, Lyrica 150 mg capsule, Disp: , Rfl:   •  pregabalin (LYRICA)  75 MG Cap, Lyrica 75 mg capsule, Disp: , Rfl:   •  progesterone (PROMETRIUM) 100 MG Cap, progesterone micronized 100 mg capsule  TAKE 1 CAPSULE(S) EVERY DAY BY ORAL ROUTE., Disp: , Rfl:   •  metoprolol SR (TOPROL XL) 50 MG TABLET SR 24 HR, metoprolol succinate ER 50 mg tablet,extended release 24 hr  TAKE 1 TABLET BY MOUTH EVERY DAY, Disp: , Rfl:   •  bisacodyl (DULCOLAX) 10 MG Suppos, Insert 1 Suppository in rectum every 24 hours as needed (if sennosides, docusate, polyethylene glycol 3350, and/or magnesium hydroxide ineffective or not ordered)., Disp: , Rfl: 0  •  docusate sodium 100 MG Cap, Take 100 mg by mouth 2 Times a Day. (Patient not taking: Reported on 10/3/2020), Disp: 60 Cap, Rfl: 0  •  baclofen (LIORESAL) 10 MG Tab, Take 1 Tab by mouth every 8 hours as needed (muscle spasms). (Patient not taking: Reported on 10/3/2020), Disp: 30 Tab, Rfl: 0  •  MethylPREDNISolone (MEDROL DOSEPAK) 4 MG Tablet Therapy Pack, Take 1 pack po as directed (Patient not taking: Reported on 10/3/2020), Disp: 1 Kit, Rfl: 0  ALLERGIES: No Known Allergies  SURGHX:   Past Surgical History:   Procedure Laterality Date   • LUMBAR FUSION O-ARM N/A 2/13/2019    Procedure: LUMBAR FUSION O-ARM- L2-S1 EXTENSION OF INTERBODY FUSION;  Surgeon: Shawn Carter M.D.;  Location: [facility];  Service: Neurosurgery   • LUMBAR DECOMPRESSION N/A 2/13/2019    Procedure: LUMBAR DECOMPRESSION;  Surgeon: Shawn Carter M.D.;  Location: [facility];  Service: Neurosurgery   • LUMBAR DECOMPRESSION  2/20/2017    Procedure: POSTERIOR RIGHT SIDE REMOVAL OF HARDWARE, LUMBAR DECOMPRESSION L4-S1  DECOMPRESSION OF NERVE ROOT  ;  Surgeon: Shawn Carter M.D.;  Location: [facility];  Service:    • LUMBAR FUSION POSTERIOR  9/7/2016    Procedure: LUMBAR FUSION POSTERIOR L4-5 TLIF;  Surgeon: Shawn Carter M.D.;  Location: [facility];  Service:    • LUMBAR LAMINECTOMY DISKECTOMY  9/7/2016    Procedure: LUMBAR LAMINECTOMY  Posterior L3-4, L4-5 Lami;  Surgeon: Shawn Carter M.D.;  Location: SURGERY [facility];  Service:    • BRONCHOSCOPY     • CERVICAL LAMINECTOMY POSTERIOR      2004   • LAMINOTOMY     • LUNG BIOPSY OPEN     • OTHER      T&A   • OTHER ORTHOPEDIC SURGERY      left rotator cuff   • ROTATOR CUFF REPAIR     • TONSILLECTOMY     • TONSILLECTOMY     • TUBAL LIGATION       SOCHX:  reports that she has never smoked. She has never used smokeless tobacco. She reports that she does not drink alcohol or use drugs.  FH: family history includes Diabetes in her sister; Hypertension in her mother.    POCT Urinalysis:   Ref Range & Units 6:37 PM   POC Color Negative YELLOW    POC Appearance Negative CLEAR    POC Leukocyte Esterase Negative SMALL    POC Nitrites Negative NEG    POC Urobiligen Negative (0.2) mg/dL 1.0    POC Protein Negative mg/dL 100    POC Urine PH 5.0 - 8.0 6.0    POC Blood Negative NEG    POC Specific Gravity <1.005 - >1.030 1.020    POC Ketones Negative mg/dL NEG    POC Bilirubin Negative mg/dL NEG    POC Glucose Negative mg/dL NEG         Assessment/Plan:        1. Acute cystitis without hematuria    - POCT Urinalysis --> + leuks, + protein  - URINE CULTURE(NEW); Future  - nitrofurantoin (MACROBID) 100 MG Cap; Take 1 Cap by mouth 2 times a day for 5 days.  Dispense: 10 Cap; Refill: 0   - Complete full course of antibiotics as prescribed   - phenazopyridine (PYRIDIUM) 100 MG Tab; Take 1 Tab by mouth 3 times a day as needed.  Dispense: 6 Tab; Refill: 0    - Pt educated on preventative measures for avoiding future UTIs  - Advised to increase fluid intake  - OTC Pyridium (Azo) for symptomatic relief, advised that it will turn urine orange in color  - Pending urine culture  - ER precautions given regarding pyelonephritis including fevers greater than 101 and, vomiting and dehydration, increased back pain.

## 2020-10-06 ENCOUNTER — TELEPHONE (OUTPATIENT)
Dept: URGENT CARE | Facility: CLINIC | Age: 60
End: 2020-10-06

## 2020-10-06 LAB
BACTERIA UR CULT: ABNORMAL
BACTERIA UR CULT: ABNORMAL
SIGNIFICANT IND 70042: ABNORMAL
SITE SITE: ABNORMAL
SOURCE SOURCE: ABNORMAL

## 2020-10-06 NOTE — TELEPHONE ENCOUNTER
Attempted to contact patient regarding positive urine culture results for E. Coli., susceptible to current course of Macrobid. She did not answer, voice mail is not set up and unable to leave message.

## 2021-06-29 ENCOUNTER — APPOINTMENT (OUTPATIENT)
Dept: RADIOLOGY | Facility: MEDICAL CENTER | Age: 61
End: 2021-06-29
Attending: FAMILY MEDICINE
Payer: COMMERCIAL

## 2021-07-14 ENCOUNTER — HOSPITAL ENCOUNTER (OUTPATIENT)
Dept: RADIOLOGY | Facility: MEDICAL CENTER | Age: 61
End: 2021-07-14
Attending: FAMILY MEDICINE
Payer: COMMERCIAL

## 2021-07-14 DIAGNOSIS — M47.816 LUMBAR SPONDYLOSIS: ICD-10-CM

## 2021-07-14 DIAGNOSIS — M96.1 POST LAMINECTOMY SYNDROME: ICD-10-CM

## 2021-07-14 DIAGNOSIS — Z98.1 HISTORY OF FUSION OF LUMBAR SPINE: ICD-10-CM

## 2021-07-14 DIAGNOSIS — M51.36 DDD (DEGENERATIVE DISC DISEASE), LUMBAR: ICD-10-CM

## 2021-07-14 DIAGNOSIS — M41.50 DEGENERATIVE SCOLIOSIS: ICD-10-CM

## 2021-07-14 DIAGNOSIS — M25.552 LEFT HIP PAIN: ICD-10-CM

## 2021-07-14 PROCEDURE — 72131 CT LUMBAR SPINE W/O DYE: CPT

## 2023-07-19 ENCOUNTER — PHARMACY VISIT (OUTPATIENT)
Dept: PHARMACY | Facility: MEDICAL CENTER | Age: 63
End: 2023-07-19
Payer: COMMERCIAL

## 2023-07-19 PROCEDURE — RXMED WILLOW AMBULATORY MEDICATION CHARGE: Performed by: INTERNAL MEDICINE

## 2023-07-19 RX ORDER — OXYCODONE HYDROCHLORIDE 10 MG/1
10 TABLET ORAL EVERY 6 HOURS
Qty: 28 TABLET | Refills: 0 | OUTPATIENT
Start: 2023-07-19 | End: 2023-07-26

## 2023-08-06 ENCOUNTER — HOSPITAL ENCOUNTER (OUTPATIENT)
Dept: RADIOLOGY | Facility: MEDICAL CENTER | Age: 63
End: 2023-08-06
Attending: NURSE PRACTITIONER
Payer: COMMERCIAL

## 2023-08-06 DIAGNOSIS — M54.16 LUMBAR RADICULOPATHY: ICD-10-CM

## 2023-08-06 PROCEDURE — 72110 X-RAY EXAM L-2 SPINE 4/>VWS: CPT

## 2023-08-21 ENCOUNTER — HOSPITAL ENCOUNTER (OUTPATIENT)
Dept: RADIOLOGY | Facility: MEDICAL CENTER | Age: 63
End: 2023-08-21
Attending: NURSE PRACTITIONER
Payer: COMMERCIAL

## 2023-08-21 DIAGNOSIS — M54.16 LUMBAR RADICULOPATHY: ICD-10-CM

## 2023-08-21 PROCEDURE — 72131 CT LUMBAR SPINE W/O DYE: CPT

## 2023-09-26 ENCOUNTER — PHARMACY VISIT (OUTPATIENT)
Dept: PHARMACY | Facility: MEDICAL CENTER | Age: 63
End: 2023-09-26
Payer: COMMERCIAL

## 2023-09-26 PROCEDURE — RXMED WILLOW AMBULATORY MEDICATION CHARGE: Performed by: NURSE PRACTITIONER

## 2023-11-13 ENCOUNTER — HOSPITAL ENCOUNTER (OUTPATIENT)
Dept: RADIOLOGY | Facility: MEDICAL CENTER | Age: 63
End: 2023-11-13
Attending: NEUROLOGICAL SURGERY
Payer: COMMERCIAL

## 2023-11-13 DIAGNOSIS — M54.50 LOW BACK PAIN, UNSPECIFIED BACK PAIN LATERALITY, UNSPECIFIED CHRONICITY, UNSPECIFIED WHETHER SCIATICA PRESENT: ICD-10-CM

## 2023-11-13 PROCEDURE — 72192 CT PELVIS W/O DYE: CPT

## (undated) DEVICE — SLEEVE STERILE  A599T - 30/BX 2BX/CS

## (undated) DEVICE — SUTURE 3-0 VICRYL PLUS RB-1 - 8 X 18 INCH (12/BX)

## (undated) DEVICE — ARMREST CRADLE FOAM - (2PR/PK 12PR/CA)

## (undated) DEVICE — TRAY CATHETER FOLEY URINE METER W/STATLOCK 350ML (10EA/CA)

## (undated) DEVICE — GOWN WARMING STANDARD FLEX - (30/CA)

## (undated) DEVICE — ELECTRODE 850 FOAM ADHESIVE - HYDROGEL RADIOTRNSPRNT (50/PK)

## (undated) DEVICE — COVER MAYO STAND X-LG - (22EA/CA)

## (undated) DEVICE — GLOVE BIOGEL INDICATOR SZ 7.5 SURGICAL PF LTX - (50PR/BX 4BX/CA)

## (undated) DEVICE — GLOVE BIOGEL PI INDICATOR SZ 8.5 SURGICAL PF LF - (50PR/BX 4BX/CA)

## (undated) DEVICE — GLOVE SZ 6.5 BIOGEL PI MICRO - PF LF (50PR/BX)

## (undated) DEVICE — GLOVE BIOGEL INDICATOR SZ 6.5 SURGICAL PF LTX - (50PR/BX 4BX/CA)

## (undated) DEVICE — TUBE E-T HI-LO CUFF 7.0MM (10EA/PK)

## (undated) DEVICE — SUCTION INSTRUMENT YANKAUER BULBOUS TIP W/O VENT (50EA/CA)

## (undated) DEVICE — SLEEVE, VASO, THIGH, MED

## (undated) DEVICE — PACK NEURO - (2EA/CA)

## (undated) DEVICE — NEPTUNE 4 PORT MANIFOLD - (20/PK)

## (undated) DEVICE — TUBING C&T SET FLYING LEADS DRAIN TUBING (10EA/BX)

## (undated) DEVICE — GLOVE BIOGEL PI ORTHO SZ 6 1/2 SURGICAL PF LF (40PR/BX)

## (undated) DEVICE — DRAPE LARGE 3 QUARTER - (20/CA)

## (undated) DEVICE — SET EXTENSION WITH 2 PORTS (48EA/CA) ***PART #2C8610 IS A SUBSTITUTE*****

## (undated) DEVICE — BONE PRESS SPINAL EDITION HENSLER (10EA/CA)

## (undated) DEVICE — CLOSURE WOUND 1/4 X 4 (STERI - STRIP) (50/BX 4BX/CA)

## (undated) DEVICE — ELECTRODE DUAL RETURN W/ CORD - (50/PK)

## (undated) DEVICE — TOWELS CLOTH SURGICAL - (4/PK 20PK/CA)

## (undated) DEVICE — CHLORAPREP 26 ML APPLICATOR - ORANGE TINT(25/CA)

## (undated) DEVICE — SYRINGE SAFETY 3 ML 18 GA X 1 1/2 BLUNT LL (100/BX 8BX/CA)

## (undated) DEVICE — SUTURE 2-0 VICRYL PLUS CT-1 - 8 X 18 INCH(12/BX)

## (undated) DEVICE — GLOVE, BIOGEL ECLIPSE, SZ 7.0, PF LTX (50/BX)

## (undated) DEVICE — MIDAS LUBRICATOR DIFFUSER PACK (4EA/CA)

## (undated) DEVICE — GOWN SURGEONS X-LARGE - DISP. (30/CA)

## (undated) DEVICE — KIT ANESTHESIA W/CIRCUIT & 3/LT BAG W/FILTER (20EA/CA)

## (undated) DEVICE — SET LEADWIRE 5 LEAD BEDSIDE DISPOSABLE ECG (1SET OF 5/EA)

## (undated) DEVICE — SUTURE 1 VICRYL PLUS CTX - 8 X 18 INCH (12/BX)

## (undated) DEVICE — LIGHT SOURCE MIS 12FT

## (undated) DEVICE — SPHERE NAVIGATION STEALTH (5EA/TY 12TY/PK)

## (undated) DEVICE — GORETEX CV-6 TH-13 D/A

## (undated) DEVICE — GLOVE BIOGEL SZ 8 SURGICAL PF LTX - (50PR/BX 4BX/CA)

## (undated) DEVICE — TOOL DISSECT MATCH HEAD

## (undated) DEVICE — SUTURE GENERAL

## (undated) DEVICE — CANISTER SUCTION 3000ML MECHANICAL FILTER AUTO SHUTOFF MEDI-VAC NONSTERILE LF DISP  (40EA/CA)

## (undated) DEVICE — CELLSAVER PACK

## (undated) DEVICE — DRESSING TRANSPARENT FILM TEGADERM 4 X 4.75" (50EA/BX)"

## (undated) DEVICE — GLOVE BIOGEL INDICATOR SZ 7SURGICAL PF LTX - (50/BX 4BX/CA)

## (undated) DEVICE — STERI STRIP COMPOUND BENZOIN - TINCTURE 0.6ML WITH APPLICATOR (40EA/BX)

## (undated) DEVICE — GLOVE BIOGEL SZ 8.5 SURGICAL PF LTX - (50PR/BX 4BX/CA)

## (undated) DEVICE — SYRINGE SAFETY 10 ML 18 GA X 1 1/2 BLUNT LL (100/BX 4BX/CA)

## (undated) DEVICE — KIT SURGIFLO W/OUT THROMBIN - (6EA/CA)

## (undated) DEVICE — HEADREST PRONEVIEW LARGE - (10/CA)

## (undated) DEVICE — DRAPE 36X28IN RAD CARM BND BG - (25/CA) O

## (undated) DEVICE — TUBE CONNECT SUCTION CLEAR 120 X 1/4" (50EA/CA)"

## (undated) DEVICE — DRAPE STRLE REG TOWEL 18X24 - (10/BX 4BX/CA)"

## (undated) DEVICE — GLOVE BIOGEL PI INDICATOR SZ 7.0 SURGICAL PF LF - (50/BX 4BX/CA)

## (undated) DEVICE — SENSOR SPO2 NEO LNCS ADHESIVE (20/BX) SEE USER NOTES

## (undated) DEVICE — DRESSING,POST OP BORDER 4INX6IN AG

## (undated) DEVICE — INTRAOP NEURO IN OR 1:1 PER 15 MIN

## (undated) DEVICE — GLOVE BIOGEL SZ 6.5 SURGICAL PF LTX (50PR/BX 4BX/CA)

## (undated) DEVICE — GLOVE BIOGEL PI INDICATOR SZ 8.0 SURGICAL PF LF -(50/BX 4BX/CA)

## (undated) DEVICE — LACTATED RINGERS INJ. 500 ML - (24EA/CA)

## (undated) DEVICE — KIT ROOM DECONTAMINATION

## (undated) DEVICE — SODIUM CHL. INJ. 0.9% 500ML (24EA/CA 50CA/PF)

## (undated) DEVICE — DRAPE MAYO STAND - (30/CA)

## (undated) DEVICE — KIT EVACUATER 3 SPRING PVC LF 1/8 DRAIN SIZE (10EA/CA)"

## (undated) DEVICE — SPONGE GAUZESTER 4 X 4 4PLY - (128PK/CA)

## (undated) DEVICE — BONE MILL BM210

## (undated) DEVICE — TUBING CLEARLINK DUO-VENT - C-FLO (48EA/CA)

## (undated) DEVICE — CELLSAVER STAT

## (undated) DEVICE — NEEDLE NON-SAFETY HYPO 21 GA X 1 1/2 IN HYPO (100/BX)

## (undated) DEVICE — DECANTER FLD BLS - (50/CA)

## (undated) DEVICE — FILTER BLOOD TRANSFUSION - (40/CA) (PALL)

## (undated) DEVICE — PACK JACKSON TABLE KIT W/OUT - HR (6EA/CA)

## (undated) DEVICE — PROTECTOR ULNA NERVE - (36PR/CA)

## (undated) DEVICE — SODIUM CHL IRRIGATION 0.9% 1000ML (12EA/CA)

## (undated) DEVICE — DEVICE MONOPOLAR RF PEAK PLASMABLADE 3.0S

## (undated) DEVICE — TUBE EMG NIM TRIVANTAGE 7MM (3EA/PK)

## (undated) DEVICE — GLOVE BIOGEL PI ORTHO SZ 7.5 PF LF (40PR/BX)

## (undated) DEVICE — SOD. CHL. INJ. 0.9% 1000 ML - (14EA/CA 60CA/PF)

## (undated) DEVICE — LACTATED RINGERS INJ 1000 ML - (14EA/CA 60CA/PF)

## (undated) DEVICE — DRAPE LAPAROTOMY T SHEET - (12EA/CA)

## (undated) DEVICE — GLOVE BIOGEL PI INDICATOR SZ 7.5 SURGICAL PF LF -(50/BX 4BX/CA)

## (undated) DEVICE — MASK ANESTHESIA ADULT  - (100/CA)

## (undated) DEVICE — PEDIGUARD CURVED (1EA)

## (undated) DEVICE — SEALER BIPOLAR 2.3 AQUAMANTYS